# Patient Record
Sex: FEMALE | Race: WHITE | NOT HISPANIC OR LATINO | Employment: UNEMPLOYED | URBAN - METROPOLITAN AREA
[De-identification: names, ages, dates, MRNs, and addresses within clinical notes are randomized per-mention and may not be internally consistent; named-entity substitution may affect disease eponyms.]

---
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
## 2024-01-29 ENCOUNTER — OFFICE VISIT (OUTPATIENT)
Dept: ENDOCRINOLOGY | Facility: HOSPITAL | Age: 19
End: 2024-01-29
Payer: COMMERCIAL

## 2024-01-29 VITALS
HEART RATE: 90 BPM | HEIGHT: 61 IN | WEIGHT: 101 LBS | OXYGEN SATURATION: 99 % | DIASTOLIC BLOOD PRESSURE: 60 MMHG | SYSTOLIC BLOOD PRESSURE: 110 MMHG | BODY MASS INDEX: 19.07 KG/M2

## 2024-01-29 DIAGNOSIS — E05.90 SUBCLINICAL HYPERTHYROIDISM: ICD-10-CM

## 2024-01-29 DIAGNOSIS — R79.89 ABNORMAL TSH: Primary | ICD-10-CM

## 2024-01-29 PROCEDURE — 99214 OFFICE O/P EST MOD 30 MIN: CPT | Performed by: STUDENT IN AN ORGANIZED HEALTH CARE EDUCATION/TRAINING PROGRAM

## 2024-01-29 NOTE — PROGRESS NOTES
1/29/2024    Assessment/Plan        Problem List Items Addressed This Visit          Other    Abnormal TSH - Primary    Relevant Orders    T4, free    T3, free    TSH, 3rd generation     Other Visit Diagnoses       Subclinical hyperthyroidism        Relevant Orders    T4, free    T3, free    TSH, 3rd generation              Assessment/Plan:  Patient is a 18yF with PMHx of HASEEB with subclinical hyperthyroidism.     1) Subclinical hyperthyroidism- Discussed differential for low TSH with normal free t4/T3 indicating subclinical hyperthyroidism. Did have a mild goiter last visit which now has resolved and given normal TSI, TRAb ab, possible could have had a silent thyroiditis then which resolved. Does have some symptoms which could be hyperthyroids vs related to HASEEB but discussed given previous low TSH, should have TFT checked every few months to monitor and make sure not clinically hyperthyroid. Last labs were normal, we will repeat one now. Discussed would ONLY tx if clinically hyperthyroid since given her age, risk of tx subclinical hyperthyroid and being hypothyroid after are higher. Can consider Prn BB if needed    RTC in 6 months     CC: Low TSH    History of Present Illness     HPI: Nato Matta is a 18 y.o. year old female with history of dysthymic disorder, HASEEB who presents today to review low TSH found on routine labs done by PCP d/t having symptoms of fatigue, weight loss and overall not feeling well.   TSH checked 05/23 was 0.47 with repeat 06/23 0.44 with normal Free t4/Free t3.   When initially seen by us, clinically felt ok. Repeat lab work which showed normal TSH and t4/T3, normal TSI, TRAb. Recommended repeat labs in 3-6 months on follow up     Reports still having issues with fatigue, tremors sometimes, some palpitations and diarrhea at times. Menses are regular. Denies any dysphagia, odynophagia.     Social hx- does vape daily, no alcohol use or any other drugs  Family hx- neg for thyroid  issues    Review of Systems   Constitutional:  Positive for fatigue and unexpected weight change.   HENT:  Negative for trouble swallowing and voice change.    Eyes:  Negative for photophobia and visual disturbance.   Respiratory:  Negative for choking and shortness of breath.    Gastrointestinal:  Positive for constipation. Negative for diarrhea.   Endocrine: Negative for cold intolerance and heat intolerance.   Musculoskeletal:  Negative for arthralgias and myalgias.   Skin:  Negative for rash.       Historical Information   Past Medical History:   Diagnosis Date    Allergic     Asthma      No past surgical history on file.  Social History   Social History     Substance and Sexual Activity   Alcohol Use Never     Social History     Substance and Sexual Activity   Drug Use Never     Social History     Tobacco Use   Smoking Status Every Day    Types: E-Cigarettes   Smokeless Tobacco Never     Family History:   Family History   Problem Relation Age of Onset    No Known Problems Mother     Addiction problem Father         Alcoholic    Asthma Sister     Asthma Maternal Grandmother     Diabetes Maternal Grandmother     Heart disease Maternal Grandmother        Meds/Allergies   Current Outpatient Medications   Medication Sig Dispense Refill    albuterol (PROVENTIL HFA,VENTOLIN HFA) 90 mcg/act inhaler Use 1-2 puffs every 4 - 6 hours for wheezing as needed (Patient taking differently: every 6 (six) hours as needed Use 1-2 puffs every 4 - 6 hours for wheezing as needed) 18 g 1    [employer] COVID-19 Home Test KIT Use as directed      cetirizine (ZyrTEC) 10 mg tablet Take 1 tablet (10 mg total) by mouth daily 30 tablet 2    DULoxetine (CYMBALTA) 60 mg delayed release capsule Duloxetine  60mg  : 2 capsule po in the morning 60 capsule 1    EPINEPHrine (EPIPEN) 0.3 mg/0.3 mL SOAJ Inject 0.3 mL (0.3 mg total) into a muscle once for 1 dose 0.6 mL 0    fluticasone (FLONASE) 50 mcg/act nasal spray 1 spray to each nostril once daily  (Patient taking differently: if needed 1 spray to each nostril once daily) 16 g 1    fluticasone (Flovent HFA) 110 MCG/ACT inhaler Inhale 2 puffs 2 (two) times a day Rinse mouth after use (Patient taking differently: Inhale 2 puffs if needed Rinse mouth after use) 12 g 1    glycopyrrolate (ROBINUL) 2 MG tablet Take 1 tablet (2 mg total) by mouth 2 (two) times a day (Patient taking differently: Take 2 mg by mouth if needed) 60 tablet 1    QUEtiapine (SEROquel) 50 mg tablet Quetiapine Fumarate 50m tablets po at night 30 tablet 1     No current facility-administered medications for this visit.     Allergies   Allergen Reactions    Dasilva - Food Allergy     Peach [Prunus Persica]     Strawberry C [Ascorbate - Food Allergy] Itching     throat    Tomato - Food Allergy     Tree Extract Other (See Comments)     Done by blood test      Cucumber Extract - Food Allergy      Throat itchy      Nuts - Food Allergy Itching       Objective   Vitals: There were no vitals taken for this visit.  Invasive Devices       None                 There is no height or weight on file to calculate BMI.  There were no vitals taken for this visit.   Wt Readings from Last 3 Encounters:   23 47.9 kg (105 lb 9.6 oz) (9%, Z= -1.35)*   23 48.4 kg (106 lb 9.6 oz) (10%, Z= -1.27)*   23 49.9 kg (110 lb) (16%, Z= -1.01)*     * Growth percentiles are based on CDC (Girls, 2-20 Years) data.       GEN: NAD  E/n/m nl facies, hearing intact bilat, tongue midline, lips nl  Eyes: no stare or proptosis, nl lids and conjunctiva, EOMI  Neck: trachea midline, thyroid NT to palpation, nl in size, no nodules or neck masses noted, no cervical LAD  CV; heart reg rate s1s2 nl, no m/r/g appreciated, no JEFERSON  Resp: CTAB, good effort  Ab+BS  Neuro: no tremor, 2+ DTRs in BUE  MS: no c/c in digits, moves all 4 ext, nl muscle bulk, gait nl  Skin: warm and dry, no palmar erythema  Ext: no c/c in digits, no edema bilaterally, 2+ DP and PT pulses bilat, no  breaks in skin/ulcers on feet, sensation intact to monofilament testing on plantar surfaces bilat  Psych: nl mood and affect, no gross lapses in memory    Physical Exam  Constitutional:       Appearance: Normal appearance. She is normal weight.   Neck:      Comments: Slightly goitrous gland without any nodules  Cardiovascular:      Rate and Rhythm: Normal rate and regular rhythm.      Pulses: Normal pulses.   Pulmonary:      Effort: Pulmonary effort is normal.   Abdominal:      General: Abdomen is flat. Bowel sounds are normal.      Palpations: Abdomen is soft.   Skin:     General: Skin is warm and dry.      Capillary Refill: Capillary refill takes less than 2 seconds.   Neurological:      General: No focal deficit present.      Mental Status: She is alert and oriented to person, place, and time.   Psychiatric:         Mood and Affect: Mood normal.         The history was obtained from the review of the chart and from the patient.    Lab Results:    Latest Reference Range & Units 05/26/23 12:41 06/19/23 10:50 08/25/23 11:24 11/02/23 10:55   TSH, POC mIU/L   0.33 (L) 0.51   Free T4 0.8 - 1.4 ng/dL 1.1 1.1 1.2 1.2   THYROGLOBULIN AB < or = 1 IU/mL  <1     (L): Data is abnormally low     Latest Reference Range & Units 08/25/23 11:24 11/02/23 10:55   Thyrotropin Bind Inh Ig <=2.00 IU/L  <1.00   THYROID STIMULATING IMMUNOGLOBULIN <140 % baseline <89 <89        Latest Reference Range & Units 05/26/23 12:41 06/19/23 10:50   TSH W/RFX TO FREE T4 mIU/L 0.47 (L) 0.44 (L)   (L): Data is abnormally low   Latest Reference Range & Units 06/19/23 10:50   Free T3 3.0 - 4.7 pg/mL 3.6       Future Appointments   Date Time Provider Department Center   11/5/2024  8:00 AM Rachel Lopez MD OBGYN [facility]

## 2024-01-30 ENCOUNTER — DOCUMENTATION (OUTPATIENT)
Dept: BEHAVIORAL/MENTAL HEALTH CLINIC | Facility: CLINIC | Age: 19
End: 2024-01-30

## 2024-01-30 DIAGNOSIS — F41.1 GENERALIZED ANXIETY DISORDER: Primary | ICD-10-CM

## 2024-01-30 LAB
T3FREE SERPL-MCNC: 3.4 PG/ML (ref 3–4.7)
T4 FREE SERPL-MCNC: 1 NG/DL (ref 0.8–1.4)
TSH SERPL-ACNC: 0.51 MIU/L

## 2024-02-09 ENCOUNTER — TELEPHONE (OUTPATIENT)
Dept: PSYCHIATRY | Facility: CLINIC | Age: 19
End: 2024-02-09

## 2024-02-09 NOTE — TELEPHONE ENCOUNTER
Rec'd message from previous provider Joan that client reached out to schedule with another provider. Joan D/C clt on 1/30 so clt can be set up as NP with new provider. LVM for clt to return call to intake for scheduling.

## 2024-02-23 ENCOUNTER — SOCIAL WORK (OUTPATIENT)
Dept: BEHAVIORAL/MENTAL HEALTH CLINIC | Facility: CLINIC | Age: 19
End: 2024-02-23

## 2024-02-23 ENCOUNTER — TELEPHONE (OUTPATIENT)
Dept: PSYCHIATRY | Facility: CLINIC | Age: 19
End: 2024-02-23

## 2024-02-23 DIAGNOSIS — F41.1 GENERALIZED ANXIETY DISORDER: Primary | ICD-10-CM

## 2024-02-23 NOTE — PSYCH
No Call. No Show. No Charge    Nato Paxton no showed 02/23/24 appointment , staff called and left message to reschedule appointment     Treatment Plan not due at this session.

## 2024-02-23 NOTE — TELEPHONE ENCOUNTER
Outreach call place due to client missing Intake appointment 2/23/24.   Received client voicemail.  Informed client the the follow up appointment that was scheduled on 3/5 @ 10 is being cancelled due to missing the Intake appointment.     Requested client to call back to set up another intake appointment.

## 2024-02-26 ENCOUNTER — NURSE TRIAGE (OUTPATIENT)
Age: 19
End: 2024-02-26

## 2024-02-26 DIAGNOSIS — B37.31 YEAST VAGINITIS: Primary | ICD-10-CM

## 2024-02-26 NOTE — TELEPHONE ENCOUNTER
"Pt called reporting sx yeast infection. Pt requesting Diflucan in the past, requesting dose.    Reason for Disposition   Symptoms of a yeast infection (i.e., itchy, white discharge, not bad smelling) and feels like prior vaginal yeast infections    Answer Assessment - Initial Assessment Questions  1. SYMPTOM: \"What's the main symptom you're concerned about?\" (e.g., pain, itching, dryness)      itching  2. LOCATION: \"Where is the  itching located?\" (e.g., inside/outside, left/right)      vaginal  3. ONSET: \"When did the  itching  start?\"      5 days ago  4. PAIN: \"Is there any pain?\" If Yes, ask: \"How bad is it?\" (Scale: 1-10; mild, moderate, severe)      Painful itch, worse at night  5. ITCHING: \"Is there any itching?\" If Yes, ask: \"How bad is it?\" (Scale: 1-10; mild, moderate, severe)      6  6. CAUSE: \"What do you think is causing the discharge?\" \"Have you had the same problem before? What happened then?\"      Milky white, mild clump  7. OTHER SYMPTOMS: \"Do you have any other symptoms?\" (e.g., fever, itching, vaginal bleeding, pain with urination, injury to genital area, vaginal foreign body)      Mild odor, not fishy  8. PREGNANCY: \"Is there any chance you are pregnant?\" \"When was your last menstrual period?\"      2/18/24    Protocols used: Vaginal Symptoms-ADULT-[place]    "

## 2024-02-27 RX ORDER — FLUCONAZOLE 150 MG/1
150 TABLET ORAL ONCE
Qty: 1 TABLET | Refills: 0 | Status: SHIPPED | OUTPATIENT
Start: 2024-02-27 | End: 2024-02-28 | Stop reason: SDUPTHER

## 2024-02-28 DIAGNOSIS — B37.31 YEAST VAGINITIS: ICD-10-CM

## 2024-02-28 RX ORDER — FLUCONAZOLE 150 MG/1
150 TABLET ORAL ONCE
Qty: 1 TABLET | Refills: 0 | Status: SHIPPED | OUTPATIENT
Start: 2024-02-28 | End: 2024-02-28

## 2024-03-07 ENCOUNTER — TELEPHONE (OUTPATIENT)
Dept: DERMATOLOGY | Facility: CLINIC | Age: 19
End: 2024-03-07

## 2024-03-07 NOTE — TELEPHONE ENCOUNTER
Lmom that 07/17/24 appt w/Dr Salcido at [facility] has been r/s to the [place] [facility] due to change in the providers schedule, to please call the office to confirm or r/s

## 2024-03-07 NOTE — TELEPHONE ENCOUNTER
Rec'd return call from patient. She states that she's aware her July appt is now in [place] office. Patient is aware of office location.

## 2024-03-20 DIAGNOSIS — F41.1 GENERALIZED ANXIETY DISORDER: ICD-10-CM

## 2024-03-20 DIAGNOSIS — F32.2 CURRENT SEVERE EPISODE OF MAJOR DEPRESSIVE DISORDER WITHOUT PSYCHOTIC FEATURES WITHOUT PRIOR EPISODE (HCC): ICD-10-CM

## 2024-03-20 RX ORDER — DULOXETIN HYDROCHLORIDE 60 MG/1
CAPSULE, DELAYED RELEASE ORAL
Qty: 60 CAPSULE | Refills: 1 | Status: SHIPPED | OUTPATIENT
Start: 2024-03-20

## 2024-03-20 RX ORDER — QUETIAPINE FUMARATE 50 MG/1
TABLET, FILM COATED ORAL
Qty: 30 TABLET | Refills: 1 | OUTPATIENT
Start: 2024-03-20

## 2024-03-20 RX ORDER — QUETIAPINE FUMARATE 50 MG/1
TABLET, FILM COATED ORAL
Qty: 30 TABLET | Refills: 1 | Status: SHIPPED | OUTPATIENT
Start: 2024-03-20 | End: 2024-03-27 | Stop reason: SDUPTHER

## 2024-03-20 RX ORDER — DULOXETIN HYDROCHLORIDE 60 MG/1
CAPSULE, DELAYED RELEASE ORAL
Qty: 60 CAPSULE | Refills: 1 | OUTPATIENT
Start: 2024-03-20

## 2024-03-20 NOTE — TELEPHONE ENCOUNTER
Pt Nato Matta , 2005 , [facility] chart reviewed Duloxetine 60mg, Quetiapine 50mg was sent to [facility]     This note was not shared with the patient due to reasonable likelihood of causing patient harm      This note may have been written with the assistance of dictation software. Please excuse any grammatical  errors, misspellings,  and abnormal spacing of letters , sentences or paragraphs . For accurate interpretation should read note horizontally

## 2024-03-21 DIAGNOSIS — Z00.6 ENCOUNTER FOR EXAMINATION FOR NORMAL COMPARISON OR CONTROL IN CLINICAL RESEARCH PROGRAM: ICD-10-CM

## 2024-03-26 NOTE — PSYCH
"   [facility]- Non Addiction  [address] [place] [address]   [phone]    Psychiatric Progress Note  MRN#: [id number]   Nato Matta 19 y.o. female.        This note was not shared with the patient due to reasonable likelihood of causing patient harm   __________________________________________________________________________________________________________________________________  OFFICE APPOINTMENT   Seen today at [facility] location                                               Patient Nato Matta female  2005   Prescriber/Physician: Andre Delarosa DO  Physician Location:   [facility]  [address] [place] [id number]     This service was provided in the office.  Patient is currently located in the [place], where I am  licensed.   Patient gave consent to proceed with encounter; acknowledge understanding of security and privacy of encounter   Patient identity was verified as well as the [facility] chart  Patient verbalized understanding evaluation only involves Psychiatric diagnosing, prescribing, result monitoring   Patient was informed this is a billable service and legal  __________________________________________________________________________________________________________      Reason for Visit:    Chief Complaint   Patient presents with    Depression        Subjective:  Nato Matta was last seen in Dec  2023, since Duloxetine was increased and Seroquel was started. Reports on slight improvement with Seroquel  \" less up and down emotional, able to sleep. Otherwise , Nato Wile complaiend of limited appetite, weight loss ( 102 lb , height 5'0), eats only dinner mostly. Denies intentional restrictions or binging, or abuse of exercise or pills.   Mood was reported as depressed and anxiety  as Nato Alessioe acknowledged mild SI,w/o plan or intent, concentration is also low . " "Described difficulties focusing on 1 things , has million different thoughts , random , also ruminating about prior childhood events, thoughts are worst at night , and this effects Nato Matta ability to do things.       ROS:  SI/HI  :  defer to above   Irritability: Nato stated \" a lot  about anything and everything, mostly towards boyfriend , non physical   Sleep: defer to above   Energy: low , although Nato reported also on restlessness when not sleeping, onset prior to starting Seroquel    Psychosis: denies   Ml:  + Impulsivity  described as spending a lot of money and going somewhere, episodes, per Alexia occurs twice within the last 2 wks. Denies increse in activiites         Medication: Nato Matta is  compliant Duloxetine 120mg, Seroquel 50mg  Medication s/e: denies   Med trail: Sertraline    Tobacco Use: High Risk (12/1/2023)    Patient History     Smoking Tobacco Use: Every Day     Smokeless Tobacco Use: Never     Passive Exposure: Not on file      Tobacco- Sera vapes nicotine daily - habit   Illicit- denies  Alcohol- denies         Medical ROS:   Pertinent items are noted in HPI, all other symptoms are negative         This note may have been written with the assistance of dictation software. Please excuse any grammatical  errors, misspellings,  and abnormal spacing of letters , sentences or paragraphs . For accurate interpretation should read note horizontally   ______________________________________________________________________________________________________________________      Mental status:    General Appearance:  Nato Matta is a 19 y.o.  female age appropriate, casually dressed, looks stated age   Behavior:  cooperative, calm, appropriate eye gaze, slight guarded    Speech:  normal for rate, rhythm, volume, latency, amount   Mood:  depressed and anxious,anger    Affect:  blunted and depressed   Thought Process:  normal and logical   Thought Content:  no overt delusions, normal " "  Perceptual Disturbances: denies auditory hallucinations when asked, denies visual hallucinations when asked, Does not appear responding or preoccupied  Delusions  w/o   Risk Potential: Suicidal Ideations w/o  Homicidal Ideations w/o  Potential for Aggression w/o   Sensorium:  Oriented to person, place ( [facility]), time/date (March 2024), and situation   Memory:  recent and remote memory grossly intact   Consciousness:  alert and awake   Attention: attention span and concentration are age appropriate   Insight:  Appropriate    Judgment: appropriate   Gait/Station: normal   Motor Activity: no abnormal movements       There were no vitals filed for this visit.      Lab Results: I have personally reviewed all pertinent laboratory/tests results. Admission Labs:       Labs: I have personally reviewed all pertinent laboratory/tests results. Admission Labs:   Orders Only on 01/30/2024   Component Date Value    Free t4 01/30/2024 1.0     TSH 01/30/2024 0.51     Free T3 01/30/2024 3.4      CBC:   Lab Results   Component Value Date    WBC 5.4 05/26/2023    RBC 4.45 05/26/2023    HGB 12.9 05/26/2023    HCT 37.8 05/26/2023    MCV 84.9 05/26/2023     05/26/2023    MCH 29.0 05/26/2023    MCHC 34.1 05/26/2023    RDW 13.7 05/26/2023    MPV 9.7 09/16/2022    NEUTROABS 3,445 05/26/2023     CMP:   Lab Results   Component Value Date    SODIUM 136 05/26/2023    K 4.1 05/26/2023     05/26/2023    CO2 26 05/26/2023    AGAP 10 09/16/2022    BUN 14 05/26/2023    CREATININE 0.68 05/26/2023    GLUC 82 05/26/2023    CALCIUM 9.1 05/26/2023    AST 13 05/26/2023    ALT 10 05/26/2023    ALKPHOS 40 05/26/2023    TP 6.5 05/26/2023    ALB 4.1 05/26/2023    TBILI 0.5 05/26/2023    EGFR 129 05/26/2023     Lipid Profile:   Lab Results   Component Value Date    CHOLESTEROL 155 05/26/2023    HDL 60 05/26/2023    TRIG 62 05/26/2023    LDLCALC 81 05/26/2023     Hemoglobin A1C/EST AVG Glucose No results found for: \"HGBA1C\", \"EAG\"  EKG "   Lab Results   Component Value Date    VENTRATE 82 09/16/2021    ATRIALRATE 82 09/16/2021    PRINT 158 09/16/2021    QRSDINT 70 09/16/2021    QTINT 358 09/16/2021    QTCINT 418 09/16/2021    PAXIS 71 09/16/2021    QRSAXIS 81 09/16/2021    TWAVEAXIS 74 09/16/2021     Imaging Studies: No results found.    PHQ-2/9 Depression Screening              Labs 05/2023 WNL CBC, CMP, Lipids , 11/2023 WNL T3       PHQ-2/9 Depression Screening    Little interest or pleasure in doing things: 3 - nearly every day  Feeling down, depressed, or hopeless: 3 - nearly every day  Trouble falling or staying asleep, or sleeping too much: 2 - more than half the days  Feeling tired or having little energy: 3 - nearly every day  Poor appetite or overeating: 3 - nearly every day  Feeling bad about yourself - or that you are a failure or have let yourself or your family down: 3 - nearly every day  Trouble concentrating on things, such as reading the newspaper or watching television: 3 - nearly every day  Moving or speaking so slowly that other people could have noticed. Or the opposite - being so fidgety or restless that you have been moving around a lot more than usual: 3 - nearly every day  Thoughts that you would be better off dead, or of hurting yourself in some way: 1 - several days  PHQ-9 Score: 24  PHQ-9 Interpretation: Severe depression        HASEEB-7 Flowsheet Screening      Flowsheet Row Most Recent Value   Over the last 2 weeks, how often have you been bothered by any of the following problems?    Feeling nervous, anxious, or on edge 3   Not being able to stop or control worrying 2   Worrying too much about different things 2   Trouble relaxing 3   Being so restless that it is hard to sit still 3   Becoming easily annoyed or irritable 3   Feeling afraid as if something awful might happen 2   HASEEB-7 Total Score 18                  _______________________________________________________________________________________    A/GENARO Matta  is a 19 y.o.  female, h/o anxiety, who presented  with several neurovegatative symptoms and generalized pattern anxiety , while on Duloxetine. Currently with worsening depression and anxiety severity on rating forms and history reported since higher dose Duloxetine  120mg > 2 months .Slight improvement of sleep and change of mood since Seroquel 50mg.  Case complicated as there's also reported anger, nightly worries , random and about prior events , and restlessness, suspicion of bipolar disorder . Currently Nato reported on SI, without plan or intent, although limited appetite and weight loss, likely due to depression      HASEEB 7: 17---> 18  PHQ9= 23--->24        DSM5  1. Current severe episode of major depressive disorder without psychotic features without prior episode (HCC)    2. Generalized anxiety disorder     Rule out Bipolar Disorder       PLAN:  History and external records were reviewed. Discussed clinical findings and diagnotic impression: depression, anxiety , supsiion of unspecified bipolar disorder  Plans to gather collateral and rating form regarding bipolarity  Increased Quetiapine from 50mg to 150mg within 2 wks, pt Nato Matta was accepting of plan  Did not change other medications    [facility]  -     QUEtiapine (SEROquel) 50 mg tablet; Quetiapine Fumarate 50m tablets at night po x 1 wk. Then increased to 1 tablet in the morning and 2 tablets at night        -     DULoxetine (CYMBALTA) 60 mg delayed release capsule; Duloxetine  60mg  : 2 capsule po in the morning       There are no discontinued medications.       Treatment Recommendations- Risks Benefits  Risks, Benefits And Possible Side Effects Of Medications:  Risks, benefits, and possible side effects of medications explained to patient and patient verbalizes understanding    Next Appt @[facility]: 1 month    __________________________________________________________________________________________________  Patient Encounter: 9:04- 9:44    Encounter Duration: Time Spent  40 with Patient.Greater than 50% of total time was spent with the patient     MDM  Number of Diagnoses or Management Options  Diagnosis management comments: 2       Amount and/or Complexity of Data Reviewed  Review and summarize past medical records: yes    Risk of Complications, Morbidity, and/or Mortality  Presenting problems: high  Diagnostic procedures: moderate  Management options: moderate         This note may have been written with the assistance of dictation software. Please excuse any grammatical  errors, misspellings,  and abnormal spacing of letters , sentences or paragraphs . For accurate interpretation should read note horizontally

## 2024-03-26 NOTE — PATIENT INSTRUCTIONS
Nato Matta [id number]   Thanks for presenting to today's Appointment at [facility]  Seroquel was increased to  100mg per night for 1 wk. Then  50mg in the morning and 100mg at night   Your other medications were not changed

## 2024-03-27 ENCOUNTER — OFFICE VISIT (OUTPATIENT)
Dept: PSYCHIATRY | Facility: CLINIC | Age: 19
End: 2024-03-27
Payer: COMMERCIAL

## 2024-03-27 DIAGNOSIS — F32.2 CURRENT SEVERE EPISODE OF MAJOR DEPRESSIVE DISORDER WITHOUT PSYCHOTIC FEATURES WITHOUT PRIOR EPISODE (HCC): Primary | ICD-10-CM

## 2024-03-27 DIAGNOSIS — F41.1 GENERALIZED ANXIETY DISORDER: ICD-10-CM

## 2024-03-27 PROCEDURE — 99214 OFFICE O/P EST MOD 30 MIN: CPT | Performed by: PSYCHIATRY & NEUROLOGY

## 2024-03-27 RX ORDER — QUETIAPINE FUMARATE 50 MG/1
TABLET, FILM COATED ORAL
Qty: 90 TABLET | Refills: 1 | Status: SHIPPED | OUTPATIENT
Start: 2024-03-27

## 2024-04-17 ENCOUNTER — NURSE TRIAGE (OUTPATIENT)
Age: 19
End: 2024-04-17

## 2024-04-17 NOTE — TELEPHONE ENCOUNTER
"Patient called stating she has been off of birth control since August 2023. States she would like to resume and asked when she could start pills. LMP 4/12. Advised patient typically started Sunday after menses - will reach out to provider to advise when she can start pill - start now vs wait for next menses. Patient states she is no longer in same-sex relationship. Advised once she starts a new pill pack, she should use a back up form of birth control (condoms) for first pack of pills. Patient aware will receive call back to confirm. Patient verbalized understanding. No further questions at this time.     Reason for Disposition   Patient has refills remaining on their prescription    Answer Assessment - Initial Assessment Questions  1. NAME of MEDICATION: \"What medicine are you calling about?\"      Colleen Vazquez  2. QUESTION: \"What is your question?\" (e.g., medication refill, side effect)      When to start birth control    Protocols used: Medication Question Call-ADULT-[place]    "

## 2024-04-18 NOTE — TELEPHONE ENCOUNTER
Placed call to patient, unable to connect.  Tried twice. [employer] message sent to patient (okay per communication form).

## 2024-05-16 ENCOUNTER — TELEPHONE (OUTPATIENT)
Dept: PSYCHIATRY | Facility: CLINIC | Age: 19
End: 2024-05-16

## 2024-05-16 NOTE — TELEPHONE ENCOUNTER
Client called to reschedule with Dr. Gipson, writer forwarded client to his team to reach out in the morning.

## 2024-05-20 ENCOUNTER — TELEPHONE (OUTPATIENT)
Dept: PSYCHIATRY | Facility: CLINIC | Age: 19
End: 2024-05-20

## 2024-05-21 ENCOUNTER — TELEPHONE (OUTPATIENT)
Dept: PSYCHIATRY | Facility: CLINIC | Age: 19
End: 2024-05-21

## 2024-05-21 PROBLEM — R20.0 NUMBNESS AND TINGLING OF RIGHT ARM: Status: RESOLVED | Noted: 2023-06-08 | Resolved: 2024-05-21

## 2024-05-21 PROBLEM — R20.2 NUMBNESS AND TINGLING OF RIGHT ARM: Status: RESOLVED | Noted: 2023-06-08 | Resolved: 2024-05-21

## 2024-05-21 RX ORDER — NORETHINDRONE ACETATE AND ETHINYL ESTRADIOL AND FERROUS FUMARATE 1.5-30(21)
1 KIT ORAL DAILY
COMMUNITY
Start: 2024-02-24 | End: 2024-05-28 | Stop reason: ALTCHOICE

## 2024-05-21 RX ORDER — NEOMYCIN SULFATE, POLYMYXIN B SULFATE AND HYDROCORTISONE 10; 3.5; 1 MG/ML; MG/ML; [USP'U]/ML
SUSPENSION/ DROPS AURICULAR (OTIC)
COMMUNITY
Start: 2024-03-30

## 2024-05-21 RX ORDER — FLUCONAZOLE 150 MG/1
TABLET ORAL
COMMUNITY
Start: 2024-02-28 | End: 2024-05-28 | Stop reason: ALTCHOICE

## 2024-05-21 NOTE — TELEPHONE ENCOUNTER
LVM regarding clts apt for today needing to be rescheduled.  Apt not scheduled for the correct amount of time.

## 2024-05-21 NOTE — PSYCH
"Psychiatric Medication Management - Behavioral Health   Nato Matta 19 y.o. female MRN: [id number]    This note was not shared with the patient due to reasonable likelihood of causing patient harm       Reason for Visit: Transfer of care for medication management    Subjective:  Medication compliance: yes  Medication side effects: denies     Nato is a 19 year old female being seen today for transfer of care appointment for medication management. Her last appointment with Dr Delarosa was 03/27/2024. Patient has psychiatric diagnoses including MDD and HASEEB. Patient is currently being observed on Cymbalta 120 mg daily and Seroquel 50 mg in AM and 100 mg at HS.  Patient was previously connected to outpatient therapy with Terri Hoang, however, is not in therapy at this time. She is interested in being placed on the wait list for another provider here at [facility]. No additional services in place at this time.     Nato reports her mood today as \"average\". States she does not feel like the Cymbalta is really helping that much. Reports that Seroquel does help slightly, however, does not think it has been beneficial for sleep. Patient states her sleep is \"not good\" as she has trouble falling asleep and staying asleep. Her sleep is often interrupted with getting up to go to the bathroom and then she has a hard time getting back to sleep. We spoke about sleep hygiene and staying off of screens before bedtime. She verbalized understanding. She states she averages 4 to 5 hours per night.  She reports her energy levels are \"fairly low\" and she has no motivation. States her appetite is not good and she only eats 1 meal per day which has been typical for her for the past few years. She states she does watch what she eats. Reports weight loss from 113 pounds to 101 pounds but she is unsure of the timeframe. Denies disordered eating.    Nato endorses acute and chronic history of symptoms suggestive of MDD (major depressive " disorder). She reports her symptoms started approximately 7 years ago or even earlier after her dad left the family. She does not  have a relationship with her father for the past 7 years. She rates her depression today a 9/10 on the severity scale.  Reports disrupted/non-restorative sleep likely exacerbating mood symptoms. Endorses limited appetite, lack of energy, and poor motivation. Reports low mood and anhedonia.  She has frequent crying spells that are typically after an angry episode because she feels bad and wonders why she reacts that way.  Adamantly denies acute thoughts of suicide or self-harm and has no plans to harm others. She does endorse thoughts of suicide that are passive in nature with no plan and no intent.  Nato reports that she can keep herself safe and knows to go to the nearest emergency room and time of crisis.  No documented history of prior suicidal gestures or suicidal attempts. Denies historical non-suicidal self injurious behavior. Future-oriented and demonstrates self-preservation as evidenced by today's evaluation in which Nato is seeking psychiatric intervention to improve overall mental health and outlook on life.  Reports feeling worthlessness, hopelessness, and guilt a lot of the time.  PHQ-9 score 25    Nato endorses acute and chronic anxiety, pathologic in nature, and suggestive of HASEEB (generalized anxiety disorder).  She states her anxiety started 7 years ago when her dad left the family.  She tends to worry about everything and anything.  Today she reports her anxiety and 8/10 on the severity scale.  Reports excessive nervousness, irrational worry, and overt anxiousness.  Nato is pervasively restless, tense, keyed-up, and chronically on-edge. She experiences disruption in energy and concentration secondary to anxiety. Experiences irritability, inability to relax, and disruption in sleep secondary to pathologic anxiety. At times, overwhelmed/consumed by irrational fear.  Denies new-onset panic symptomatology or maladaptive behaviors.  Denies past or present panic attacks.  HASEEB-7 score 20     Nato reports a lot of the time she feels aggression without much of a trigger. She said it could be about anything and occurs multiple times per day. She endorses frequent mood swings multiple times per day and feels irritable most of the time. She denies any elevated moods where she did not need much sleep. She can go hours where she can get more done than usual.  She has periods of impulsiveness where she will spend money that she does not have.  Reports 1 time this impulsivity of spending interfered with her daily bills.  She reports some impulsive sexual promiscuity. She denies AH/VH.  Denies any symptoms suggestive of ADHD, OCD, PTSD. There are no access to guns or weapons in the home. Will start to wean off the Cymbalta to 90 mg for 1 week and then down to 60 mg for 1 week.  Increase the Seroquel to 200 mg at bedtime.    Review Of Systems:     Constitutional Negative   ENT Negative   Cardiovascular Negative   Respiratory Negative   Gastrointestinal Negative   Genitourinary Negative   Musculoskeletal Negative   Integumentary Negative   Neurological Negative   Endocrine Negative     Past Medical History:   Patient Active Problem List   Diagnosis    Other idiopathic scoliosis, thoracolumbar region    Generalized anxiety disorder    Dysthymic disorder    Asthma    Multiple allergies    Abnormal TSH    Current severe episode of major depressive disorder without psychotic features without prior episode (HCC)       Seizure history-  Denies    Allergies:   Allergies   Allergen Reactions    Measles And Rubella Virus Vaccine Anaphylaxis, Hives and Shortness Of Breath    Cherry - Food Allergy     Peach [Prunus Persica]     Strawberry C [Ascorbate - Food Allergy] Itching     throat    Tomato - Food Allergy     Tree Extract Other (See Comments)     Done by blood test      Cucumber Extract - Food  "Allergy      Throat itchy      Nuts - Food Allergy Itching       Past Surgical History: History reviewed. No pertinent surgical history.    Past Psychiatric History:   Past Inpatient Psychiatric Treatment:   No history of past inpatient psychiatric admissions  Past Outpatient Psychiatric Treatment:    Dr Isaura Calabrese  Past Suicide Attempts: no  Past Violent Behavior: yes  Past Psychiatric Medication Trials: Prozac, Zoloft, Lexapro, Cymbalta, and Seroquel    Family Psychiatric History:   Maternal Grandmother- mental illness on medication  Mom- undiagnosed bipolar    Social History:   Living situation- Mom and brother   Single - in a relationship  Kids- no  Occupation - In process of finding a new job  Hobbies- Not really- spending time with BF and dog  Still enjoys doing things    Substance Abuse History:   Denies use of alcohol, nicotine, tobacco, marijuana, or other illicit drugs.      Traumatic History:    Denies history of physical, verbal, sexual, and emotional abuse.    The following portions of the patient's history were reviewed and updated as appropriate: allergies, current medications, past family history, past medical history, past social history, past surgical history, and problem list.    Objective:  There were no vitals filed for this visit.      Weight (last 2 days)       None            Mental status:  Appearance sitting comfortably in chair, restless and fidgety, dressed in casual clothing, adequate hygiene and grooming   Mood \"Average\"    Affect Appears generally euthymic, stable, mood-congruent   Speech Normal rate, rhythm, and volume   Thought Processes Linear and goal directed   Associations intact associations   Hallucinations Denies any auditory or visual hallucinations   Thought Content Fleeting passive suicidal ideation and No plan or intent, is able to keep herself safe   Orientation Oriented to person, place, time, and situation   Recent and Remote Memory Grossly intact   Attention Span " and Concentration Concentration intact   Intellect Appears to be of Average Intelligence   Insight Insight intact   Judgement judgment was intact   Muscle Strength Muscle strength and tone were normal   Language Within normal limits   Fund of Knowledge Age appropriate   Pain None     PHQ-A Depression Screening    Feeling down, depressed, irritable or hopeless: 3 - nearly every day  Little interest or pleasure in doing things: 3 - nearly every day  Trouble falling or staying asleep, or sleeping too much: 3 - nearly every day  Poor appetite or overeating: 3 - nearly every day  Feeling tired or having little energy: 3 - nearly every day  Feeling bad about yourself - or that you are a failure or have let yourself or your family down: 3 - nearly every day  Trouble concentrating on things, such as reading the newspaper or watching television: 3 - nearly every day  Moving or speaking so slowly that other people could have noticed. Or the opposite - being so fidgety or restless that you have been moving around a lot more than usual: 2 - more than half the days  Thoughts that you would be better off dead, or of hurting yourself in some way: 2 - more than half the days          HASEEB-7 Flowsheet Screening      Flowsheet Row Most Recent Value   Over the last 2 weeks, how often have you been bothered by any of the following problems?    Feeling nervous, anxious, or on edge 3   Not being able to stop or control worrying 3   Worrying too much about different things 3   Trouble relaxing 3   Being so restless that it is hard to sit still 3   Becoming easily annoyed or irritable 3   Feeling afraid as if something awful might happen 2   HASEEB-7 Total Score 20           Assessment/Plan:     Impression:  Major depressive disorder  Generalized anxiety disorder  Mood Disorder  R/O  Bipolar Disorder     1.   Decrease Cymbalta to 90 mg daily for 1 week then decrease to 60 mg for 1 week for anxiety and depression. (Will continue to wean off  this medication.)  Increase Seroquel 200 mg daily for mood and sleep  Interested in being put on the wait list for outpatient therapy at   Aware to go to the nearest emergency room for crisis  Medical follow up with PCP as needed  Aware of 24 hour and weekend coverage for urgent situations accessed by calling [facility] Outpatient main practice number  Follow up in 3 weeks     Diagnoses:   Diagnoses and all orders for this visit:    Current severe episode of major depressive disorder without psychotic features without prior episode (HCC)  -     DULoxetine (Cymbalta) 30 mg delayed release capsule; Take 1 capsule (30 mg total) by mouth daily  -     DULoxetine (CYMBALTA) 60 mg delayed release capsule; Take 1 capsule (60 mg total) by mouth daily  -     QUEtiapine (SEROquel) 100 mg tablet; Take 2 tablets at bedtime (for a total of 200 mg) daily    Mood disorder (HCC)    Generalized anxiety disorder  -     DULoxetine (Cymbalta) 30 mg delayed release capsule; Take 1 capsule (30 mg total) by mouth daily  -     DULoxetine (CYMBALTA) 60 mg delayed release capsule; Take 1 capsule (60 mg total) by mouth daily  -     QUEtiapine (SEROquel) 100 mg tablet; Take 2 tablets at bedtime (for a total of 200 mg) daily    Other orders  -     fluconazole (DIFLUCAN) 150 mg tablet; TAKE ONE TABLET BY MOUTH FOR ONE DOSE (Patient not taking: Reported on 5/22/2024)  -     neomycin-polymyxin-hydrocortisone (CORTISPORIN) 0.35%-10,000 units/mL-1% otic suspension; instill THREE drops INTO THE AFFECTED EAR TWICE DAILY FOR 7 DAYS (Patient not taking: Reported on 5/22/2024)  -     Nahomi FE 1.5/30 1.5-30 MG-MCG tablet; Take 1 tablet by mouth daily (Patient not taking: Reported on 5/22/2024)          Treatment Recommendations:      Risks, Benefits And Possible Side Effects Of Medications:  Risks, benefits, and possible side effects of medications explained to patient and family, they verbalize understanding    Controlled  Medication Discussion: No records found for controlled prescriptions according to [place] Prescription Drug Monitoring Program.      Treatment Plan:Treatment plan was completed at today's visit. Patient verbally consented and signed form while in the office today. Next treatment plan due 11/22/2024.     Visit Time    Visit Start Time: 1:00 PM  Visit Stop Time: 1:30 PM  Total Visit Duration:  30 minutes      ANSHU Renee  05/22/24

## 2024-05-22 ENCOUNTER — OFFICE VISIT (OUTPATIENT)
Dept: PSYCHIATRY | Facility: CLINIC | Age: 19
End: 2024-05-22
Payer: COMMERCIAL

## 2024-05-22 DIAGNOSIS — F39 MOOD DISORDER (HCC): ICD-10-CM

## 2024-05-22 DIAGNOSIS — F41.1 GENERALIZED ANXIETY DISORDER: ICD-10-CM

## 2024-05-22 DIAGNOSIS — F32.2 CURRENT SEVERE EPISODE OF MAJOR DEPRESSIVE DISORDER WITHOUT PSYCHOTIC FEATURES WITHOUT PRIOR EPISODE (HCC): Primary | ICD-10-CM

## 2024-05-22 PROCEDURE — 99214 OFFICE O/P EST MOD 30 MIN: CPT

## 2024-05-22 RX ORDER — DULOXETIN HYDROCHLORIDE 60 MG/1
60 CAPSULE, DELAYED RELEASE ORAL DAILY
Qty: 30 CAPSULE | Refills: 0 | Status: SHIPPED | OUTPATIENT
Start: 2024-05-22

## 2024-05-22 RX ORDER — QUETIAPINE FUMARATE 100 MG/1
TABLET, FILM COATED ORAL
Qty: 60 TABLET | Refills: 0 | Status: SHIPPED | OUTPATIENT
Start: 2024-05-22

## 2024-05-22 RX ORDER — DULOXETIN HYDROCHLORIDE 30 MG/1
30 CAPSULE, DELAYED RELEASE ORAL DAILY
Qty: 7 CAPSULE | Refills: 0 | Status: SHIPPED | OUTPATIENT
Start: 2024-05-22

## 2024-05-22 NOTE — BH TREATMENT PLAN
TREATMENT PLAN (Medication Management Only)        [facility]    Name and Date of Birth:  Nato Matta 19 y.o. 2005  Date of Treatment Plan: May 22, 2024  Diagnosis/Diagnoses:   1. Current severe episode of major depressive disorder without psychotic features without prior episode (HCC)    2. Generalized anxiety disorder      Strengths/Personal Resources for Self-Care: taking medications as prescribed, ability to adapt to life changes, ability to communicate needs, ability to communicate well, ability to listen, ability to reason, ability to understand psychiatric illness.  Area/Areas of need (in own words): anxiety symptoms, depressive symptoms  1. Long Term Goal: maintain improvement in depression.    Target Date: 11/22/2024  Person/Persons responsible for completion of goal: Nato  2.  Short Term Objective (s) - How will we reach this goal?:   A. Provider new recommended medication/dosage changes and/or continue medication(s): continue current medications as prescribed.  B. Keep regularly scheduled psychiatric appointments  C. Maintain adherence to psychotropic medication regimen   D. Exercise daily (at least 30 mins)  E. Maintain appropriate dietary intake  F. Practice adequate sleep hygiene    Target Date: 11/22/2024  Person/Persons Responsible for Completion of Goal: Nato  Progress Towards Goals: continuing treatment  Treatment Modality: medication management every 4 weeks  Review due 180 days from date of this plan:  11/22/2024  Expected length of service: ongoing treatment  My Physician/PA/NP and I have developed this plan together and I agree to work on the goals and objectives. I understand the treatment goals that were developed for my treatment.

## 2024-05-27 ENCOUNTER — NURSE TRIAGE (OUTPATIENT)
Dept: OTHER | Facility: OTHER | Age: 19
End: 2024-05-27

## 2024-05-27 NOTE — TELEPHONE ENCOUNTER
"Reason for Disposition  • Blood in urine  (Exception: could be normal menstrual bleeding)    Answer Assessment - Initial Assessment Questions  1. COLOR of URINE: \"Describe the color of the urine.\"  (e.g., tea-colored, pink, red, blood clots, bloody)     Red    2. ONSET: \"When did the bleeding start?\"       A couple of days    3. EPISODES: \"How many times has there been blood in the urine?\" or \"How many times today?\"  7 times    4. PAIN with URINATION: \"Is there any pain with passing your urine?\" If Yes, ask: \"How bad is the pain?\"  (Scale 1-10; or mild, moderate, severe)     - MILD - complains slightly about urination hurting     - MODERATE - interferes with normal activities       - SEVERE - excruciating, unwilling or unable to urinate because of the pain       8/10     5. FEVER: \"Do you have a fever?\" If Yes, ask: \"What is your temperature, how was it measured, and when did it start?\"   No    6. ASSOCIATED SYMPTOMS: \"Are you passing urine more frequently than usual?\"      Yes    7. OTHER SYMPTOMS: \"Do you have any other symptoms?\" (e.g., back/flank pain, abdominal pain, vomiting)    Painful urination and mild abdominal pain      8. PREGNANCY: \"Is there any chance you are pregnant?\" \"When was your last menstrual period?\"    Denies    Protocols used: Urine - Blood In-ADULT-    "

## 2024-05-27 NOTE — TELEPHONE ENCOUNTER
"Regarding: possible UTI / burning / blood  ----- Message from Yue MERINO sent at 5/27/2024  6:38 PM EDT -----  \"I think I have a UTI, it hurts to pee, it kind of burns and then I feel like I'm done and it keeps going and then it really hurts and there a little blood and just overall discomfort\"    "

## 2024-05-27 NOTE — TELEPHONE ENCOUNTER
"Regarding: painful urination/burning/blood  ----- Message from Josefina THACKER sent at 5/27/2024  6:01 PM EDT -----  \"I think I have a UTI, it hurts to pee, it kind of burns and then I feel like I'm done and it keeps going and then it really hurts and there a little blood and just overall discomfort\"    "

## 2024-05-28 ENCOUNTER — OFFICE VISIT (OUTPATIENT)
Age: 19
End: 2024-05-28
Payer: COMMERCIAL

## 2024-05-28 VITALS — SYSTOLIC BLOOD PRESSURE: 110 MMHG | BODY MASS INDEX: 17.79 KG/M2 | DIASTOLIC BLOOD PRESSURE: 68 MMHG | WEIGHT: 92.6 LBS

## 2024-05-28 DIAGNOSIS — N76.0 VAGINAL INFECTION: ICD-10-CM

## 2024-05-28 DIAGNOSIS — R31.9 HEMATURIA, UNSPECIFIED TYPE: ICD-10-CM

## 2024-05-28 DIAGNOSIS — R39.9 UTI SYMPTOMS: Primary | ICD-10-CM

## 2024-05-28 DIAGNOSIS — R30.0 BURNING WITH URINATION: ICD-10-CM

## 2024-05-28 LAB
SL AMB  POCT GLUCOSE, UA: ABNORMAL
SL AMB LEUKOCYTE ESTERASE,UA: ABNORMAL
SL AMB POCT BILIRUBIN,UA: ABNORMAL
SL AMB POCT BLOOD,UA: ABNORMAL
SL AMB POCT CLARITY,UA: CLEAR
SL AMB POCT COLOR,UA: YELLOW
SL AMB POCT KETONES,UA: ABNORMAL
SL AMB POCT NITRITE,UA: ABNORMAL
SL AMB POCT PH,UA: ABNORMAL
SL AMB POCT SPECIFIC GRAVITY,UA: 1
SL AMB POCT URINE PROTEIN: ABNORMAL
SL AMB POCT UROBILINOGEN: NORMAL

## 2024-05-28 PROCEDURE — 99213 OFFICE O/P EST LOW 20 MIN: CPT | Performed by: OBSTETRICS & GYNECOLOGY

## 2024-05-28 PROCEDURE — 81002 URINALYSIS NONAUTO W/O SCOPE: CPT | Performed by: OBSTETRICS & GYNECOLOGY

## 2024-05-28 RX ORDER — NITROFURANTOIN 25; 75 MG/1; MG/1
100 CAPSULE ORAL 2 TIMES DAILY
Qty: 14 CAPSULE | Refills: 2 | Status: SHIPPED | OUTPATIENT
Start: 2024-05-28 | End: 2024-06-04

## 2024-05-28 RX ORDER — PHENAZOPYRIDINE HYDROCHLORIDE 200 MG/1
200 TABLET, FILM COATED ORAL
Qty: 6 TABLET | Refills: 2 | Status: SHIPPED | OUTPATIENT
Start: 2024-05-28 | End: 2024-05-30

## 2024-05-28 RX ORDER — FLUCONAZOLE 150 MG/1
150 TABLET ORAL WEEKLY
Qty: 2 TABLET | Refills: 2 | Status: SHIPPED | OUTPATIENT
Start: 2024-05-28 | End: 2024-06-05

## 2024-05-28 NOTE — TELEPHONE ENCOUNTER
Patient returned call, s/w Stacia in office okay to see Dr. Proctor for concern due to Dr. Lazo is on Post-call today & OOO. Pt verbalized consent to schedule with Dr. Proctor; 12:30 appt 5/28.

## 2024-05-28 NOTE — PROGRESS NOTES
What we talked about today:    There are no Patient Instructions on file for this visit.        Assessment/Plan:    1. UTI symptoms  -     phenazopyridine (PYRIDIUM) 200 mg tablet; Take 1 tablet (200 mg total) by mouth 3 (three) times a day with meals for 2 days  -     nitrofurantoin (MACROBID) 100 mg capsule; Take 1 capsule (100 mg total) by mouth 2 (two) times a day for 7 days  2. Burning with urination  -     POCT urine dip  -     Urinalysis with microscopic  -     Urine culture  3. Hematuria, unspecified type  -     POCT urine dip  -     Urinalysis with microscopic  -     Urine culture  -     phenazopyridine (PYRIDIUM) 200 mg tablet; Take 1 tablet (200 mg total) by mouth 3 (three) times a day with meals for 2 days  -     nitrofurantoin (MACROBID) 100 mg capsule; Take 1 capsule (100 mg total) by mouth 2 (two) times a day for 7 days  4. Vaginal infection  -     fluconazole (DIFLUCAN) 150 mg tablet; Take 1 tablet (150 mg total) by mouth once a week for 2 doses        Subjective:      Patient ID: Nato Matta is a 19 y.o. female, presents today complaining of UTI sxs    HPI:    Pt states burning since the past 4 days and now worsening.  Seeing blood now in urine.  Admits to intimacy 3 days prior to sxs onset w/ female partner.  Did share toys.  Foul odor started 2 days ago w/ voiding.          The following portions of the patient's history were reviewed and updated as appropriate: allergies, current medications, past family history, past medical history, past social history, past surgical history, and problem list.      Review of Systems   Constitutional:  Negative for chills, fatigue and fever.   Respiratory: Negative.     Cardiovascular: Negative.    Gastrointestinal: Negative.    Endocrine: Negative.    Genitourinary: Negative.    Musculoskeletal: Negative.    Skin: Negative.    Allergic/Immunologic: Negative.    Neurological: Negative.    Hematological: Negative.    Psychiatric/Behavioral: Negative.                Objective:      /68 (BP Location: Right arm, Patient Position: Sitting, Cuff Size: Standard)   Wt 42 kg (92 lb 9.6 oz)   LMP 05/07/2024 (Exact Date)   BMI 17.79 kg/m²        Physical Exam  Vitals reviewed.   Constitutional:       General: She is not in acute distress.     Appearance: Normal appearance. She is not ill-appearing.   HENT:      Head: Normocephalic and atraumatic.   Eyes:      Extraocular Movements: Extraocular movements intact.   Abdominal:      General: There is no distension.      Palpations: Abdomen is soft.      Tenderness: There is abdominal tenderness.   Musculoskeletal:      Cervical back: Normal range of motion.   Skin:     General: Skin is warm and dry.   Neurological:      Mental Status: She is alert.   Psychiatric:         Mood and Affect: Mood normal.         Behavior: Behavior normal.         Thought Content: Thought content normal.         Judgment: Judgment normal.       TTP over suprapubic area.      NO CVA tenderness bilaterally.     GYN exam: deferred.     Wetprep was not performed today.              BRISA Proctor MD, FACOG

## 2024-05-30 ENCOUNTER — TELEPHONE (OUTPATIENT)
Dept: PSYCHIATRY | Facility: CLINIC | Age: 19
End: 2024-05-30

## 2024-05-30 DIAGNOSIS — J30.2 SEASONAL ALLERGIC RHINITIS, UNSPECIFIED TRIGGER: ICD-10-CM

## 2024-05-30 RX ORDER — CETIRIZINE HYDROCHLORIDE 10 MG/1
10 TABLET ORAL DAILY
Qty: 90 TABLET | Refills: 1 | Status: SHIPPED | OUTPATIENT
Start: 2024-05-30 | End: 2024-11-26

## 2024-06-03 NOTE — PSYCH
"Psychiatric Medication Management - Behavioral Health   Nato Matta 19 y.o. female MRN: [id number]    This note was not shared with the patient due to reasonable likelihood of causing patient harm     Reason for Visit: 3-week follow-up for medication management    Subjective:  Medication compliance: Yes  Medication side effects: lower dose of Cymbalta felt increased depression    Nato is a 19 year old female being seen today for 3-week follow-up appointment for medication management. Patient has psychiatric diagnoses including MDD and HASEEB. Patient is currently being observed on Cymbalta 90 mg daily and Seroquel 200 mg daily.  Nato is currently on the wait list for outpatient therapy at [facility]. No additional services in place at this time.  Nato was personally seen and evaluated today at the [facility] outpatient clinic for follow-up appointment for medication management.    Nato had messaged this provider through [facility] wanting to discuss borderline personality disorder at this appointment.  Nato believes a lot of things in her life lined up with borderline personality disorder.  She states when she decreased the Cymbalta to 60 mg she felt increased depression and went back to the 90 mg daily.  She reports that Seroquel helped with sleep but feels the Cymbalta 90 mg is not working great.  She reports her mood today is \"not great at all\".  Sleep has improved with the Seroquel increase.  Energy levels and motivation are low.  She reports her appetite is adequate however she only eats once per day.  She has passive thoughts of suicide but adamantly denies a plan or intent.  Denies AH/VH.  Still reports mood swings daily multiple times per day and irritability.  Denies any elevated moods.  No access to guns or weapons.  Nato reports her anxiety and 9/10 on the severity scale and depression a 9/10 on the severity scale.    Nato and writer discussed borderline personality disorder. " She feels this is a better fit for her as she does not feel she is bipolar. She states she researched about borderline personality disorder and states she has had to deal with not having a dad since childhood.  And has constant fear of abandonment. She states she does not have many relationships and the one she does have even with her mom they have to walk on eggshells around her as they are unstable and lakshmi.  She reports impulsive spending and reckless driving or going far away to random places on a whim.  She states if something bad happened in her life she would get angry and cry. She reports frequent SI's with threats to hurt herself in the past for example when she was 14 years old she reports cutting herself a lot.  She last was cutting 1 month ago.  Nato reports difficulty controlling her anger.  Writer and Nato discussed that there i no medication treatment for borderline personality disorder and that typically DBT therapy is recommended.  Nato is currently on the wait list for outpatient therapy.  Will cross titrate Cymbalta 90 mg, decrease Cymbalta to 60 mg for 1 week and 30 mg for another week and then stop.  Start venlafaxine 37.5 mg daily. Advised Nato if she would have increased suicidal thoughts with plan or intent she should seek care at the nearest emergency room.  She verbalized understanding and a [facility] safety plan was completed.    Advised Nato about serotonin Syndrome is a rare, but potentially severe condition caused by an increase in serotonin, a chemical found naturally in all of our bodies. This condition can be caused by the use of medicines that increase levels of serotonin in the brain, which include several types of antidepressants. Serotonin syndrome is most likely to happen within 24 hours after.  Of these, SSRI antidepressants are most commonly prescribed, and include: citalopram, escitalopram, fluoxetine, paroxetine and sertraline and the SNRI antidepressants,  venlafaxine and duloxetine. Other substances that may contribute include: cocaine, lithium, ecstasy, amphetamines, LSD, Satnam’s Wort and opioid pain killers, e.g. codeine and tramadol.  Serotonin syndrome results from increased amounts of serotonin in the body due to certain medications or drugs.    Signs and symptoms include: confusion, restlessness, mood changes, such as irritation or confusion, diarrhea, fever, muscle stiffness, especially in the legs, increased heart rate and increased blood pressure, sweating or shivering, arrhythmia (a fast or abnormal heartbeat), and seizures are some of the commonly noted symptoms associated with serotonin syndrome.    Review Of Systems:     Constitutional Negative   ENT Negative   Cardiovascular Negative   Respiratory Negative   Gastrointestinal Negative   Genitourinary Negative   Musculoskeletal Negative   Integumentary Negative   Neurological Negative   Endocrine Negative     Past Medical History:     Patient Active Problem List   Diagnosis    Other idiopathic scoliosis, thoracolumbar region    Generalized anxiety disorder    Dysthymic disorder    Asthma    Multiple allergies    Abnormal TSH    Current severe episode of major depressive disorder without psychotic features without prior episode (HCC)       Allergies:     Allergies   Allergen Reactions    Measles And Rubella Virus Vaccine Anaphylaxis, Hives and Shortness Of Breath    Cherry - Food Allergy     Peach [Prunus Persica]     Strawberry C [Ascorbate - Food Allergy] Itching     throat    Tomato - Food Allergy     Tree Extract Other (See Comments)     Done by blood test      Cucumber Extract - Food Allergy      Throat itchy      Nuts - Food Allergy Itching       Past Surgical History:   History reviewed. No pertinent surgical history.    Past Psychiatric History:   Past Inpatient Psychiatric Treatment:   No history of past inpatient psychiatric admissions  Past Outpatient Psychiatric Treatment:    Dr Isaura Barrera  "[place]  Past Suicide Attempts: no  Past Violent Behavior: yes  Past Psychiatric Medication Trials: Prozac, Zoloft, Lexapro, Cymbalta, and Seroquel    Family Psychiatric History:   Maternal Grandmother- mental illness on medication  Mom- undiagnosed bipolar    Social History:   Living situation- Mom and brother   Single - in a relationship  Kids- no  Occupation - In process of finding a new job  Hobbies- Not really- spending time with BF and dog  Still enjoys doing things    Substance Abuse History:    Denies use of alcohol, nicotine, tobacco, caffeine, marijuana, or other illicit drugs.      Traumatic History:    Denies history of physical, verbal, sexual, and emotional abuse.    The following portions of the patient's history were reviewed and updated as appropriate: allergies, current medications, past family history, past medical history, past social history, past surgical history, and problem list.    Objective:  There were no vitals filed for this visit.      Weight (last 2 days)       None            Mental status:  Appearance sitting comfortably in chair, dressed in casual clothing, cooperative with interview   Mood \" Not great at all\"    Affect Appears generally euthymic, stable, mood-congruent   Speech Normal rate, rhythm, and volume   Thought Processes [place]   Associations intact associations   Hallucinations Denies any auditory or visual hallucinations   Thought Content Fleeting passive suicidal ideation   Orientation Oriented to person, place, time, and situation   Recent and Remote Memory Grossly intact   Attention Span and Concentration Concentration intact   Intellect Appears to be of Average Intelligence   Insight Limited insight   Judgement judgment was limited   Muscle Strength Muscle strength and tone were normal   Language Within normal limits   Fund of Knowledge Age appropriate   Pain None     PHQ-A Depression Screening               Assessment/Plan:     Impression:  Major depressive " disorder  Generalized anxiety disorder  Mood Disorder  R/O  Bipolar Disorder     Continue Decrease Cymbalta to 60 mg for 1 week then decrease to 30 mg for one week then stop   Start Venlafaxine 37.5 mg daily for anxiety and depression   Continue Seroquel 200 mg daily for mood and sleep   Currently on the wait list for outpatient therapy at [facility]  Medical follow up with PCP as needed  Aware of 24 hour and weekend coverage for urgent situations accessed by calling [facility] Outpatient main practice number  Follow up in 3 weeks     Diagnoses:   Diagnoses and all orders for this visit:    Generalized anxiety disorder    Dysthymic disorder        Treatment Recommendations:      Risks, Benefits And Possible Side Effects Of Medications:  Risks, benefits, and possible side effects of medications explained to patient and family, they verbalize understanding and Reviewed risks/benefits and side effects of antidepressant medications including black box warning on antidepressants, patient and family verbalize understanding.    Controlled Medication Discussion: No records found for controlled prescriptions according to [place] Prescription Drug Monitoring Program.      Treatment Plan:Next treatment plan due 11/22/2024.     Visit Time    Visit Start Time: 8:55 AM  Visit Stop Time: 9:10 AM  Total Visit Duration:  15 minutes      ANSHU Renee  06/12/24

## 2024-06-12 ENCOUNTER — OFFICE VISIT (OUTPATIENT)
Dept: PSYCHIATRY | Facility: CLINIC | Age: 19
End: 2024-06-12
Payer: COMMERCIAL

## 2024-06-12 DIAGNOSIS — F41.1 GENERALIZED ANXIETY DISORDER: Primary | ICD-10-CM

## 2024-06-12 DIAGNOSIS — F34.1 DYSTHYMIC DISORDER: ICD-10-CM

## 2024-06-12 PROCEDURE — 99212 OFFICE O/P EST SF 10 MIN: CPT

## 2024-06-12 RX ORDER — VENLAFAXINE HYDROCHLORIDE 37.5 MG/1
37.5 CAPSULE, EXTENDED RELEASE ORAL DAILY
Qty: 30 CAPSULE | Refills: 0 | Status: SHIPPED | OUTPATIENT
Start: 2024-06-12

## 2024-06-12 NOTE — BH CRISIS PLAN
Client Name: Nato Matta       Client YOB: 2005    [facility] Safety Plan      Creation Date: 6/12/24    Created By: ANSHU Renee       Step 1: Warning Signs:   Warning Signs   cry   [place]            Step 2: Internal Coping Strategies:   Internal Coping Strategies   being with Dog   color            Step 3: People and social settings that provide distraction:   Name Contact Information   Mom in cell phone            Step 4: People whom I can ask for help during a crisis:      Name Contact Information    Mom in phone    sometimes Boyfriend in phone      Step 5: Professionals or agencies I can contact during a crisis:      Clinican/Agency Name Phone Emergency Contact    [facility]        [phone]      Local Emergency Department Emergency Department Phone Emergency Department Address    911          Crisis Phone Numbers:   Suicide Prevention Lifeline: Call or Text  345 Crisis Text Line: Text HOME to 702-836   Please note: Some [facility] do not have a separate number for Child/Adolescent specific crisis. If your county is not listed under Child/Adolescent, please call the adult number for your county      Adult Crisis Numbers: Child/Adolescent Crisis Numbers   [facility]: [phone] [facility]: [phone]   [facility]: [phone] [facility]: [phone]   [facility]: [phone] [place]: [phone]   [facility]: [phone] [place]: [phone]   [facility]: [phone]   [facility]: [phone]   [facility]: [phone]   [facility]: [phone] (daytime) [phone] (after hours, weekends, holidays)      Step 6: Making the environment safer (plan for lethal means safety):      Optional: What is most important to me and worth living for?      [place] Safety Plan. Diane Rodriguez and Jakub Quiñones. Used with permission of the authors.

## 2024-07-01 DIAGNOSIS — F41.1 GENERALIZED ANXIETY DISORDER: ICD-10-CM

## 2024-07-01 DIAGNOSIS — F32.2 CURRENT SEVERE EPISODE OF MAJOR DEPRESSIVE DISORDER WITHOUT PSYCHOTIC FEATURES WITHOUT PRIOR EPISODE (HCC): ICD-10-CM

## 2024-07-03 RX ORDER — QUETIAPINE FUMARATE 100 MG/1
TABLET, FILM COATED ORAL
Qty: 60 TABLET | Refills: 0 | Status: SHIPPED | OUTPATIENT
Start: 2024-07-03

## 2024-07-07 ENCOUNTER — PATIENT MESSAGE (OUTPATIENT)
Dept: FAMILY MEDICINE CLINIC | Facility: CLINIC | Age: 19
End: 2024-07-07

## 2024-07-08 ENCOUNTER — TELEPHONE (OUTPATIENT)
Dept: PSYCHIATRY | Facility: CLINIC | Age: 19
End: 2024-07-08

## 2024-07-08 NOTE — TELEPHONE ENCOUNTER
Writer left voicemail for client regarding rescheduling appointment and potential for transportation services.

## 2024-07-08 NOTE — PATIENT COMMUNICATION
Patient had reached out to the office, to go ahead and schedule an appointment.     Went ahead and transferred patient over to [place], to get that appointment made, as no openings on my end.

## 2024-07-11 ENCOUNTER — OFFICE VISIT (OUTPATIENT)
Dept: FAMILY MEDICINE CLINIC | Facility: CLINIC | Age: 19
End: 2024-07-11
Payer: COMMERCIAL

## 2024-07-11 VITALS
HEIGHT: 61 IN | RESPIRATION RATE: 15 BRPM | TEMPERATURE: 98 F | WEIGHT: 93.6 LBS | HEART RATE: 76 BPM | OXYGEN SATURATION: 98 % | DIASTOLIC BLOOD PRESSURE: 62 MMHG | BODY MASS INDEX: 17.67 KG/M2 | SYSTOLIC BLOOD PRESSURE: 90 MMHG

## 2024-07-11 DIAGNOSIS — M65.9 TENOSYNOVITIS OF RIGHT HAND: Primary | ICD-10-CM

## 2024-07-11 DIAGNOSIS — Z88.9 MULTIPLE ALLERGIES: ICD-10-CM

## 2024-07-11 DIAGNOSIS — M65.9 TENOSYNOVITIS OF RIGHT FOREARM: ICD-10-CM

## 2024-07-11 PROCEDURE — 99213 OFFICE O/P EST LOW 20 MIN: CPT | Performed by: FAMILY MEDICINE

## 2024-07-11 RX ORDER — NAPROXEN 500 MG/1
500 TABLET ORAL 2 TIMES DAILY WITH MEALS
Qty: 60 TABLET | Refills: 0 | Status: SHIPPED | OUTPATIENT
Start: 2024-07-11

## 2024-07-11 NOTE — PROGRESS NOTES
Ambulatory Visit  Name: Nato Matta      : 2005      MRN: [id number]  Encounter Provider: Brie Rubio DO  Encounter Date: 2024   Encounter department: [facility]    Assessment & Plan   1. Tenosynovitis of right hand  Assessment & Plan:  Patient's symptoms are consistent with a de Quervain's tenosynovitis of the right thumb and wrist.  We will do the trial of the thumb spica splint as well as temporary use of an anti-inflammatory to help in addition, we will refer her to occupational therapy as indicated.  Will monitor closely.  Orders:  -     Ambulatory Referral to Occupational Therapy; Future  -     naproxen (Naprosyn) 500 mg tablet; Take 1 tablet (500 mg total) by mouth 2 (two) times a day with meals  -     Elastic Bandages & Supports (Thumb Stabilizer/Right Small) MISC; Use daily  2. Tenosynovitis of right forearm  -     Ambulatory Referral to Occupational Therapy; Future  -     naproxen (Naprosyn) 500 mg tablet; Take 1 tablet (500 mg total) by mouth 2 (two) times a day with meals  -     Elastic Bandages & Supports (Thumb Stabilizer/Right Small) MISC; Use daily  3. Multiple allergies  Assessment & Plan:  She is interested in undergoing an evaluation with an allergist for her multiple allergies.  She will schedule this in the near future.  Orders:  -     Ambulatory Referral to Allergy; Future     Chief Complaint   Patient presents with   • Arm Pain     Right wrist/arm pain tingling and painful       History of Present Illness     Nato Matta is a 19 y.o. female who presents today for evaluation of persistent pain in her right wrist and right hand that is not subsiding.  She has pain I the right wrist and hand for 2 months- it comes and goes- once a day to multiple times a day- lasting a few minutes- she wakes up with it- it will last longer in the morning.  There is some trouble with gripping things at time and there is occasional tingling into her thumb and wrist.   It is confined to the right wrist and thumb.  There is some radiation to her mid forearm.  It is not constant.  It happens intermittently.  There is no weakness.  There is no swelling or deformity.      Arm Pain   The incident occurred more than 1 week ago. There was no injury mechanism. The pain is present in the right hand and right wrist. The quality of the pain is described as aching and burning. The pain is at a severity of 8/10. The pain is moderate. The pain has been Intermittent since the incident. Associated symptoms include muscle weakness and tingling. Pertinent negatives include no chest pain or numbness.     Review of Systems   Constitutional: Negative.    HENT: Negative.     Eyes: Negative.    Respiratory: Negative.     Cardiovascular: Negative.  Negative for chest pain.   Gastrointestinal: Negative.    Endocrine: Negative.    Genitourinary: Negative.    Musculoskeletal:  Positive for myalgias.   Skin: Negative.    Allergic/Immunologic: Negative.    Neurological:  Positive for tingling. Negative for numbness.   Hematological: Negative.    Psychiatric/Behavioral: Negative.       Past Medical History:   Diagnosis Date   • Allergic    • Asthma    • Depression      No past surgical history on file.  Family History   Problem Relation Age of Onset   • No Known Problems Mother    • Addiction problem Father         Alcoholic   • Asthma Sister    • Asthma Maternal Grandmother    • Diabetes Maternal Grandmother    • Heart disease Maternal Grandmother      Social History     Tobacco Use   • Smoking status: Every Day     Types: E-Cigarettes   • Smokeless tobacco: Never   Vaping Use   • Vaping status: Every Day   • Substances: Nicotine   Substance and Sexual Activity   • Alcohol use: Never   • Drug use: Never   • Sexual activity: Yes     Partners: Female     Birth control/protection: None     Current Outpatient Medications on File Prior to Visit   Medication Sig   • cetirizine (ZyrTEC) 10 mg tablet Take 1 tablet  "(10 mg total) by mouth daily   • EPINEPHrine (EPIPEN) 0.3 mg/0.3 mL SOAJ Inject 0.3 mL (0.3 mg total) into a muscle once for 1 dose   • QUEtiapine (SEROquel) 100 mg tablet Take 2 tablets at bedtime (for a total of 200 mg) daily   • venlafaxine (EFFEXOR-XR) 37.5 mg 24 hr capsule Take 1 capsule (37.5 mg total) by mouth daily   • DULoxetine (CYMBALTA) 60 mg delayed release capsule Take 1 capsule (60 mg total) by mouth daily     Allergies   Allergen Reactions   • Measles And Rubella Virus Vaccine Anaphylaxis, Hives and Shortness Of Breath   • Dasilva - Food Allergy    • Peach [Prunus Persica]    • Strawberry C [Ascorbate - Food Allergy] Itching     throat   • Tomato - Food Allergy    • Tree Extract Other (See Comments)     Done by blood test     • Cucumber Extract - Food Allergy      Throat itchy     • Nuts - Food Allergy Itching     Immunization History   Administered Date(s) Administered   • DTaP,unspecified 2005, 2004, 2004, 08/18/2005, 10/14/2009   • HPV 03/30/2015, 06/02/2015   • HPV9 01/04/2023   • Hep A, ped/adol, 2 dose 2004, 01/20/2010, 01/04/2023   • Hep B, Adolescent or Pediatric 2005, 2005, 2004   • HiB 2005, 2005, 2004, 05/19/2005   • INFLUENZA 10/05/2010, 08/24/2016, 01/31/2018   • IPV 2004, 2004, 05/15/2005, 10/14/2009   • Influenza, injectable, quadrivalent, preservative free 0.5 mL 02/12/2020   • MMR 05/19/2005   • Meningococcal ACWY, unspecified 03/30/2015   • Meningococcal B, Recombinant (TRUMENBA) 10/21/2021   • Meningococcal MCV4P 02/12/2020   • Pneumococcal Conjugate 13-Valent 2004, 2004, 2004, 02/15/2005   • Tdap 03/30/2015   • Varicella 05/15/2005, 11/04/2009     Objective     BP 90/62 (BP Location: Right arm, Patient Position: Sitting, Cuff Size: Standard)   Pulse 76   Temp 98 °F (36.7 °C) (Tympanic)   Resp 15   Ht 5' 0.5\" (1.537 m)   Wt 42.5 kg (93 lb 9.6 oz)   LMP 07/08/2024   SpO2 98%   BMI 17.98 " kg/m²     Physical Exam  Constitutional:       General: She is not in acute distress.     Appearance: Normal appearance. She is well-developed. She is not diaphoretic.   HENT:      Head: Normocephalic and atraumatic.      Right Ear: Tympanic membrane, ear canal and external ear normal.      Left Ear: Tympanic membrane, ear canal and external ear normal.      Nose: Nose normal.      Mouth/Throat:      Mouth: Mucous membranes are moist.      Pharynx: Oropharynx is clear. No oropharyngeal exudate.   Eyes:      General: No scleral icterus.        Right eye: No discharge.         Left eye: No discharge.      Extraocular Movements: Extraocular movements intact.      Pupils: Pupils are equal, round, and reactive to light.   Neck:      Thyroid: No thyromegaly.      Vascular: No JVD.      Trachea: No tracheal deviation.   Cardiovascular:      Rate and Rhythm: Normal rate and regular rhythm.      Heart sounds: Normal heart sounds. No murmur heard.     No friction rub. No gallop.   Pulmonary:      Effort: Pulmonary effort is normal. No respiratory distress.      Breath sounds: Normal breath sounds. No wheezing or rales.   Chest:      Chest wall: No tenderness.   Abdominal:      General: Bowel sounds are normal. There is no distension.      Palpations: Abdomen is soft. There is no mass.      Tenderness: There is no abdominal tenderness. There is no guarding or rebound.      Hernia: No hernia is present.   Musculoskeletal:         General: Tenderness present. No deformity. Normal range of motion.      Right wrist: Tenderness present. No swelling, deformity, effusion, lacerations, bony tenderness, snuff box tenderness or crepitus. Normal range of motion. Normal pulse.      Right hand: Tenderness present. No swelling, deformity or lacerations. Normal range of motion. Normal strength. Normal sensation. There is no disruption of two-point discrimination. Normal capillary refill. Normal pulse.      Cervical back: Normal range of  motion and neck supple.      Comments: Positive Finkelstein test in the right hand and wrist.   Lymphadenopathy:      Cervical: No cervical adenopathy.   Skin:     General: Skin is warm and dry.      Coloration: Skin is not pale.      Findings: No erythema or rash.   Neurological:      Mental Status: She is alert and oriented to person, place, and time.      Cranial Nerves: No cranial nerve deficit.      Sensory: No sensory deficit.      Motor: No abnormal muscle tone.      Coordination: Coordination normal.      Deep Tendon Reflexes: Reflexes normal.   Psychiatric:         Mood and Affect: Mood normal.         Behavior: Behavior normal.         Thought Content: Thought content normal.         Judgment: Judgment normal.       Administrative Statements   I have spent a total time of 20 minutes in caring for this patient on the day of the visit/encounter including Diagnostic results, Prognosis, Risks and benefits of tx options, Instructions for management, Patient and family education, Importance of tx compliance, Risk factor reductions, Impressions, Counseling / Coordination of care, Documenting in the medical record, and Reviewing / ordering tests, medicine, procedures  .

## 2024-07-12 PROBLEM — M65.941 TENOSYNOVITIS OF RIGHT HAND: Status: ACTIVE | Noted: 2024-07-12

## 2024-07-12 PROBLEM — M65.9 TENOSYNOVITIS OF RIGHT HAND: Status: ACTIVE | Noted: 2024-07-12

## 2024-07-12 NOTE — ASSESSMENT & PLAN NOTE
Patient's symptoms are consistent with a de Quervain's tenosynovitis of the right thumb and wrist.  We will do the trial of the thumb spica splint as well as temporary use of an anti-inflammatory to help in addition, we will refer her to occupational therapy as indicated.  Will monitor closely.

## 2024-07-12 NOTE — ASSESSMENT & PLAN NOTE
She is interested in undergoing an evaluation with an allergist for her multiple allergies.  She will schedule this in the near future.

## 2024-07-17 ENCOUNTER — CONSULT (OUTPATIENT)
Dept: DERMATOLOGY | Facility: CLINIC | Age: 19
End: 2024-07-17
Payer: COMMERCIAL

## 2024-07-17 VITALS — BODY MASS INDEX: 18.61 KG/M2 | WEIGHT: 98.6 LBS | HEIGHT: 61 IN

## 2024-07-17 DIAGNOSIS — L70.0 ACNE VULGARIS: Primary | ICD-10-CM

## 2024-07-17 DIAGNOSIS — Z12.83 ENCOUNTER FOR SCREENING FOR MALIGNANT NEOPLASM OF SKIN: ICD-10-CM

## 2024-07-17 PROCEDURE — 99244 OFF/OP CNSLTJ NEW/EST MOD 40: CPT | Performed by: STUDENT IN AN ORGANIZED HEALTH CARE EDUCATION/TRAINING PROGRAM

## 2024-07-17 RX ORDER — SPIRONOLACTONE 25 MG/1
TABLET ORAL
Qty: 60 TABLET | Refills: 3 | Status: SHIPPED | OUTPATIENT
Start: 2024-07-17

## 2024-07-17 NOTE — PROGRESS NOTES
"[facility]atology [facility] Note     Patient Name: Nato Matta  Encounter Date: 07/17/2024     Have you been cared for by a [facility] Dermatologist in the last 3 years and, if so, which description applies to you?    NO.   I am considered a \"new\" patient and must complete all patient intake questions. I am FEMALE/of child-bearing potential.    REVIEW OF SYSTEMS:  Have you recently had or currently have any of the following? Recent fever or chills? No  Any non-healing wound? No  Are you pregnant or planning to become pregnant? No  Are you currently or planning to be nursing or breast feeding? No   PAST MEDICAL HISTORY:  Have you personally ever had or currently have any of the following?  If \"YES,\" then please provide more detail. Skin cancer (such as Melanoma, Basal Cell Carcinoma, Squamous Cell Carcinoma?  No  Tuberculosis, HIV/AIDS, Hepatitis B or C: No  Radiation Treatment No   HISTORY OF IMMUNOSUPPRESSION:   Do you have a history of any of the following:  Systemic Immunosuppression such as Diabetes, Biologic or Immunotherapy, Chemotherapy, Organ Transplantation, Bone Marrow Transplantation?  No    Answering \"YES\" requires the addition of the dotphrase \"IMMUNOSUPPRESSED\" as the first diagnosis of the patient's visit.   FAMILY HISTORY:  Any \"first degree relatives\" (parent, brother, sister, or child) with the following?    Skin Cancer, Pancreatic or Other Cancer? No   PATIENT EXPERIENCE:    Do you want the Dermatologist to perform a COMPLETE skin exam today including a clinical examination under the \"bra and underwear\" areas?  NO  If necessary, do we have your permission to call and leave a detailed message on your Preferred Phone number that includes your specific medical information?  Yes      Allergies   Allergen Reactions    Measles And Rubella Virus Vaccine Anaphylaxis, Hives and Shortness Of Breath    Cherry - Food Allergy     Peach [Prunus Persica]     Strawberry C [Ascorbate - Food Allergy] Itching     " "throat    Tomato - Food Allergy     Tree Extract Other (See Comments)     Done by blood test      Cucumber Extract - Food Allergy      Throat itchy      Nuts - Food Allergy Itching      Current Outpatient Medications:     cetirizine (ZyrTEC) 10 mg tablet, Take 1 tablet (10 mg total) by mouth daily, Disp: 90 tablet, Rfl: 1    DULoxetine (CYMBALTA) 60 mg delayed release capsule, Take 1 capsule (60 mg total) by mouth daily, Disp: 30 capsule, Rfl: 0    Elastic Bandages & Supports (Thumb Stabilizer/Right Small) MISC, Use daily, Disp: 1 each, Rfl: 0    EPINEPHrine (EPIPEN) 0.3 mg/0.3 mL SOAJ, Inject 0.3 mL (0.3 mg total) into a muscle once for 1 dose, Disp: 0.6 mL, Rfl: 0    naproxen (Naprosyn) 500 mg tablet, Take 1 tablet (500 mg total) by mouth 2 (two) times a day with meals, Disp: 60 tablet, Rfl: 0    QUEtiapine (SEROquel) 100 mg tablet, Take 2 tablets at bedtime (for a total of 200 mg) daily, Disp: 60 tablet, Rfl: 0    venlafaxine (EFFEXOR-XR) 37.5 mg 24 hr capsule, Take 1 capsule (37.5 mg total) by mouth daily, Disp: 30 capsule, Rfl: 0          Whom besides the patient is providing clinical information about today's encounter?   NO ADDITIONAL HISTORIAN (patient alone provided history)    Physical Exam and Assessment/Plan by Diagnosis:    ACNE VULGARIS (\"COMMON ACNE\")    Physical Exam:  Anatomic Location Affected:  Face (primarily chin/jawline), chest, back  Morphological Description: Scattered erythematous papules, pustules, cystic lesions  Pertinent Positives:  Pertinent Negatives:    Additional History of Present Condition:  New patient. Using Tana face wash and moisturizer.     History notable for acne that flares around menses  Denies hirsutism, irregular menses  Discussed that history and physical are consistent with hormonal acne   Educated that hormonal acne does particularly well with medications that target hormones, including spironolactone and OCP. Patient prefers to avoid OCP at this time.  Denies family " "history of breast cancer,, liver disease, kidney disease, hyperkalemia  Patient's blood pressure does run lower (90s/60s) Discussed at length need for close self monitoring and signs of low blood pressure. Educated to take with large amounts of water and stop if experiencing dizziness or light headedness.   Educated that unless over age 45 or with a history of renal/liver disease, hyperkalemia, or medication interactions, evidence-based medicine does not support routine lab studies in patients on spironolactone.   The potential link to estrogen-sensitive cancers in high dose animal studies such as breast cancer was discussed and the patient was counseled that the most recent meta analyses on spironolactone in humans on typical dosing has not demonstrated this risk.       Discussed that treatment is directed at improving skin appearance and reducing the likelihood of scarring. Discussed theraputic ladder including topical OTC treatments, topical prescriptions, and oral medications. Discussed side effects as noted below.    Plan today:  Follow up in 3 months     AM:  - Start benzoyl peroxide cleanser (over the counter products like Panoxyl, Cetaphil, CeraVe and Neutrogena contain this product listed under \"active ingredients\". Try to find Benzoyl Peroxide 4% or less). Use on face and body. Leave on for 5 mins before rinsing and rinse well (bleaches clothing)  - Start non-comedogenic moisturizer such as CeraVe, Cetaphil or Vanicream.   - Start SPF 30 or greater (can be a lotion with SPF like CeraVe AM)       PM:  - Double cleanse (micellar water followed by gentle cleanser)  - Start tretinoin 0.025% cream nightly to face followed by non-comedogenic moisturizer. If retinoid is too drying, may employ the \"sandwich method.\" To do this, apply layer of non-comedogenic moisturizer, followed by layer of retinoid, followed by another layer of non-comedogenic moisturizer.     ORAL:   Start spironolactone 25 mg PO daily. " Educated to start with 25 mg nightly for two weeks then increase to BID dosing if tolerated. S/E reviewed including menstrual irregularity, breast tenderness, headaches, GI upset, K+ retention, palpitations, teratogenicity/feminization of male fetus. Handout provided.        Call with any questions or concerns before next follow-up visit; do not stop medications abruptly without consulting provider. Call our office at [phone] (SKIN).  It is better to be safe than to be sorry!        Scribe Attestation      I,:  Lazara Mojica MA am acting as a scribe while in the presence of the attending physician.:       I,:  Serafin Salcido MD personally performed the services described in this documentation    as scribed in my presence.:

## 2024-07-18 DIAGNOSIS — R39.9 UTI SYMPTOMS: Primary | ICD-10-CM

## 2024-07-18 RX ORDER — NITROFURANTOIN 25; 75 MG/1; MG/1
100 CAPSULE ORAL 2 TIMES DAILY
Qty: 10 CAPSULE | Refills: 3 | Status: SHIPPED | OUTPATIENT
Start: 2024-07-18 | End: 2024-07-23

## 2024-07-18 RX ORDER — PHENAZOPYRIDINE HYDROCHLORIDE 200 MG/1
200 TABLET, FILM COATED ORAL
Qty: 6 TABLET | Refills: 3 | Status: SHIPPED | OUTPATIENT
Start: 2024-07-18 | End: 2024-07-20

## 2024-07-19 ENCOUNTER — TELEPHONE (OUTPATIENT)
Age: 19
End: 2024-07-19

## 2024-07-19 NOTE — TELEPHONE ENCOUNTER
PA for Tretinoin 0.05% cream     Submitted via    [x]CMM-KEY [id number]  []Surescripts-Case ID #   []Faxed to plan   []Other website   []Phone call Case ID #     Office notes sent, clinical questions answered. Awaiting determination    Turnaround time for your insurance to make a decision on your Prior Authorization can take 7-21 business days.

## 2024-07-23 DIAGNOSIS — R79.89 ABNORMAL TSH: Primary | ICD-10-CM

## 2024-07-29 ENCOUNTER — NURSE TRIAGE (OUTPATIENT)
Age: 19
End: 2024-07-29

## 2024-07-29 NOTE — TELEPHONE ENCOUNTER
"Patient called in stating that she's having frequency and pain with urination. Pt reporting this started for about 2 weeks and was treated for a UTI with macrobid and pt reporting that once medication was stopped her symptoms came back. Pt reporting blood in the urine.     Attempted to find the patient an appt, pt was warm transferred to [place] for further assistance with scheduling.            Reason for Disposition   Patient wants to be seen    Answer Assessment - Initial Assessment Questions  1. SYMPTOM: \"What's the main symptom you're concerned about?\" (e.g., frequency, incontinence)      Frequency, pain with urination  2. ONSET: \"When did the  symptoms  start?\"      2 weeks ago  3. PAIN: \"Is there any pain?\" If Yes, ask: \"How bad is it?\" (Scale: 1-10; mild, moderate, severe)      8/10  4. CAUSE: \"What do you think is causing the symptoms?\"      Pt reporting UTI  5. OTHER SYMPTOMS: \"Do you have any other symptoms?\" (e.g., fever, flank pain, blood in urine, pain with urination)      Pt c/o pain with urination. Pt reporting blood in the urine pt denies, fever, flank pain,   6. PREGNANCY: \"Is there any chance you are pregnant?\" \"When was your last menstrual period?\"      Pt denies, LMP 7/8/24    Protocols used: Urinary Symptoms-ADULT-[place]    "

## 2024-07-30 ENCOUNTER — OFFICE VISIT (OUTPATIENT)
Age: 19
End: 2024-07-30
Payer: COMMERCIAL

## 2024-07-30 VITALS — DIASTOLIC BLOOD PRESSURE: 68 MMHG | WEIGHT: 99 LBS | SYSTOLIC BLOOD PRESSURE: 100 MMHG | BODY MASS INDEX: 19.02 KG/M2

## 2024-07-30 DIAGNOSIS — B37.31 YEAST VAGINITIS: ICD-10-CM

## 2024-07-30 DIAGNOSIS — R39.9 UTI SYMPTOMS: Primary | ICD-10-CM

## 2024-07-30 PROCEDURE — 99213 OFFICE O/P EST LOW 20 MIN: CPT | Performed by: OBSTETRICS & GYNECOLOGY

## 2024-07-30 RX ORDER — SULFAMETHOXAZOLE AND TRIMETHOPRIM 800; 160 MG/1; MG/1
1 TABLET ORAL EVERY 12 HOURS SCHEDULED
Qty: 21 TABLET | Refills: 0 | Status: SHIPPED | OUTPATIENT
Start: 2024-07-30 | End: 2024-08-06

## 2024-07-30 RX ORDER — PHENAZOPYRIDINE HYDROCHLORIDE 200 MG/1
200 TABLET, FILM COATED ORAL
Qty: 6 TABLET | Refills: 1 | Status: SHIPPED | OUTPATIENT
Start: 2024-07-30 | End: 2024-08-01

## 2024-07-30 RX ORDER — FLUCONAZOLE 150 MG/1
150 TABLET ORAL WEEKLY
Qty: 3 TABLET | Refills: 0 | Status: SHIPPED | OUTPATIENT
Start: 2024-07-30 | End: 2024-08-14

## 2024-07-30 NOTE — PROGRESS NOTES
What we talked about today:    Patient Instructions    Recurrent UTI:  unfortunately last Ucx was contaminated.  Pt took macrobid but symptoms are back.  Bactrim x 7 days and may need to extend once a day from day 8-14, depending on urine culture.    If UTI is adequately treated and has recurrent bouts, will need to send to urology for evaluation.    Discussed untreated UTIs can lead to a kidney infection and possible sepsis.    Start probiotics and take daily for at least 2-3months.            Assessment/Plan:    1. UTI symptoms  -     Urinalysis with microscopic  -     Urine culture  -     POCT wet mount  -     phenazopyridine (PYRIDIUM) 200 mg tablet; Take 1 tablet (200 mg total) by mouth 3 (three) times a day with meals for 2 days  -     sulfamethoxazole-trimethoprim (BACTRIM DS) 800-160 mg per tablet; Take 1 tablet by mouth every 12 (twelve) hours for 7 days  -     Chlamydia/GC amplified DNA by PCR; Future; Expected date: 08/13/2024  -     Chlamydia/GC amplified DNA by PCR  2. Yeast vaginitis  -     fluconazole (DIFLUCAN) 150 mg tablet; Take 1 tablet (150 mg total) by mouth once a week for 3 doses          Subjective:      Patient ID: Nato Matta is a 19 y.o. female, presents today complaining of UTI sxs    HPI:    Pt states the UTI sxs started again after finishing macrobid, 2 days after.  Now having UTI sxs again and pressure.  UTI sxs are less than when she came in last time.  Pt notes a foul odor to her urine.  Denies fevers or chills.       The following portions of the patient's history were reviewed and updated as appropriate: allergies, current medications, past family history, past medical history, past social history, past surgical history, and problem list.      Review of Systems   Constitutional:  Negative for chills, fatigue and fever.   Respiratory: Negative.     Cardiovascular: Negative.    Gastrointestinal: Negative.    Endocrine: Negative.    Genitourinary:  Positive for dysuria and frequency.    Musculoskeletal: Negative.    Skin: Negative.    Allergic/Immunologic: Negative.    Neurological: Negative.    Hematological: Negative.    Psychiatric/Behavioral: Negative.               Objective:      /68 (BP Location: Left arm, Patient Position: Sitting, Cuff Size: Standard)   Wt 44.9 kg (99 lb)   LMP 07/08/2024   BMI 19.02 kg/m²        Physical Exam  Vitals reviewed.   Constitutional:       General: She is not in acute distress.     Appearance: Normal appearance. She is not ill-appearing.   HENT:      Head: Normocephalic and atraumatic.   Eyes:      Extraocular Movements: Extraocular movements intact.   Musculoskeletal:      Cervical back: Normal range of motion.   Skin:     General: Skin is warm and dry.   Neurological:      Mental Status: She is alert.   Psychiatric:         Mood and Affect: Mood normal.         Behavior: Behavior normal.         Thought Content: Thought content normal.         Judgment: Judgment normal.           GYN exam: deferred.     Suprapubic tenderness noted.  Absent CVAT  bilaterally.             BRISA Proctor MD, FACOG

## 2024-07-30 NOTE — PATIENT INSTRUCTIONS
Recurrent UTI:  unfortunately last Ucx was contaminated.  Pt took macrobid but symptoms are back.  Bactrim x 7 days and may need to extend once a day from day 8-14, depending on urine culture.    If UTI is adequately treated and has recurrent bouts, will need to send to urology for evaluation.    Discussed untreated UTIs can lead to a kidney infection and possible sepsis.    Start probiotics and take daily for at least 2-3months.

## 2024-08-02 ENCOUNTER — TELEPHONE (OUTPATIENT)
Age: 19
End: 2024-08-02

## 2024-08-02 DIAGNOSIS — F41.1 GENERALIZED ANXIETY DISORDER: ICD-10-CM

## 2024-08-02 DIAGNOSIS — F32.2 CURRENT SEVERE EPISODE OF MAJOR DEPRESSIVE DISORDER WITHOUT PSYCHOTIC FEATURES WITHOUT PRIOR EPISODE (HCC): ICD-10-CM

## 2024-08-05 RX ORDER — QUETIAPINE FUMARATE 100 MG/1
TABLET, FILM COATED ORAL
Qty: 60 TABLET | Refills: 0 | Status: SHIPPED | OUTPATIENT
Start: 2024-08-05

## 2024-08-08 ENCOUNTER — TELEPHONE (OUTPATIENT)
Age: 19
End: 2024-08-08

## 2024-08-08 DIAGNOSIS — B37.31 YEAST VAGINITIS: ICD-10-CM

## 2024-08-08 DIAGNOSIS — R39.9 UTI SYMPTOMS: Primary | ICD-10-CM

## 2024-08-08 DIAGNOSIS — N34.2 INFECTIVE URETHRITIS: Primary | ICD-10-CM

## 2024-08-08 RX ORDER — LEVOFLOXACIN 500 MG/1
500 TABLET, FILM COATED ORAL EVERY 24 HOURS
Qty: 7 TABLET | Refills: 0 | Status: SHIPPED | OUTPATIENT
Start: 2024-08-08 | End: 2024-08-15

## 2024-08-08 RX ORDER — FLUCONAZOLE 150 MG/1
150 TABLET ORAL WEEKLY
Qty: 2 TABLET | Refills: 0 | Status: SHIPPED | OUTPATIENT
Start: 2024-08-08 | End: 2024-08-16

## 2024-08-29 ENCOUNTER — OFFICE VISIT (OUTPATIENT)
Dept: PSYCHIATRY | Facility: CLINIC | Age: 19
End: 2024-08-29
Payer: COMMERCIAL

## 2024-08-29 DIAGNOSIS — F41.1 GENERALIZED ANXIETY DISORDER: Primary | ICD-10-CM

## 2024-08-29 DIAGNOSIS — F32.2 CURRENT SEVERE EPISODE OF MAJOR DEPRESSIVE DISORDER WITHOUT PSYCHOTIC FEATURES WITHOUT PRIOR EPISODE (HCC): ICD-10-CM

## 2024-08-29 PROCEDURE — 99212 OFFICE O/P EST SF 10 MIN: CPT

## 2024-08-29 RX ORDER — VENLAFAXINE HYDROCHLORIDE 75 MG/1
75 CAPSULE, EXTENDED RELEASE ORAL DAILY
Qty: 30 CAPSULE | Refills: 0 | Status: SHIPPED | OUTPATIENT
Start: 2024-08-29

## 2024-08-29 RX ORDER — FLUCONAZOLE 150 MG/1
TABLET ORAL
COMMUNITY
Start: 2024-08-16

## 2024-08-29 NOTE — PSYCH
"Psychiatric Medication Management - Behavioral Health   Nato Matta 19 y.o. female MRN: [id number]    This note was not shared with the patient due to reasonable likelihood of causing patient harm     Reason for Visit:  follow-up for medication management    Subjective:  Medication compliance: Yes  Medication side effects: No    Nato is a 19 year old female being seen today for  follow-up appointment for medication management. Patient has psychiatric diagnoses including MDD and HASEEB. Patient is currently being observed on Effexor 37.5 mg and Seroquel 200 mg daily.  Nato is currently on the wait list for outpatient therapy at [facility]. No additional services in place at this time.       Nato reports medication compliance and denies any side effects at this time.  She reports she does not feel much of a difference from the Effexor 37.5 mg and reports her mood as \"more angry\".  Sleep is adequate, however energy levels and motivation remain low.  She reports her appetite has decreased.  Nato denies suicidal thoughts, plan, or intent.  She denies HI, AH, or VH.  She endorses verbal aggression towards her boyfriend and mom intermittently.  Nato reports she is irritable \"often\".  She denies frequent crying spells or any periods of elevated mood.  She reports both her anxiety and depression an 8/10 on the severity scale with 10 being the most severe.  She voices her frustration about waiting for a therapist so long after she was established with somebody here at [facility].  Nato states she is working at [employer] as a  which is \"pretty decent\".  She states she is thinking of starting  school which is a 3-month program.  She is interested in Botox administration.  Will increase Effexor to 75 mg daily    Review Of Systems:     Constitutional Negative   ENT Negative   Cardiovascular Negative   Respiratory Negative   Gastrointestinal Negative   Genitourinary Negative "   Musculoskeletal Negative   Integumentary Negative   Neurological Negative   Endocrine Negative     Past Medical History:     Patient Active Problem List   Diagnosis    Other idiopathic scoliosis, thoracolumbar region    Generalized anxiety disorder    Dysthymic disorder    Asthma    Multiple allergies    Abnormal TSH    Current severe episode of major depressive disorder without psychotic features without prior episode (HCC)    Tenosynovitis of right hand       Allergies:     Allergies   Allergen Reactions    Measles And Rubella Virus Vaccine Anaphylaxis, Hives and Shortness Of Breath    Cherry - Food Allergy     Peach [Prunus Persica]     Strawberry C [Ascorbate - Food Allergy] Itching     throat    Tomato - Food Allergy     Tree Extract Other (See Comments)     Done by blood test      Cucumber Extract - Food Allergy      Throat itchy      Nuts - Food Allergy Itching       Past Surgical History:   No past surgical history on file.    Past Psychiatric History:   Past Inpatient Psychiatric Treatment:   No history of past inpatient psychiatric admissions  Past Outpatient Psychiatric Treatment:    Dr Isaura Calabrese  Past Suicide Attempts: no  Past Violent Behavior: yes  Past Psychiatric Medication Trials: Prozac, Zoloft, Lexapro, Cymbalta, and Seroquel    Family Psychiatric History:   Maternal Grandmother- mental illness on medication  Mom- undiagnosed bipolar    Social History:   Living situation- Mom and brother   Single - in a relationship  Kids- no  Occupation - In process of finding a new job  Hobbies- Not really- spending time with BF and dog  Still enjoys doing things    Substance Abuse History:    Denies use of alcohol, nicotine, tobacco, caffeine, marijuana, or other illicit drugs.      Traumatic History:    Denies history of physical, verbal, sexual, and emotional abuse.    The following portions of the patient's history were reviewed and updated as appropriate: allergies, current medications, past family  "history, past medical history, past social history, past surgical history, and problem list.    Objective:  There were no vitals filed for this visit.      Weight (last 2 days)       None            Mental status:  Appearance sitting comfortably in chair, dressed in casual clothing, cooperative with interview   Mood \" More irritable\"   Affect Appears generally euthymic, stable, mood-congruent   Speech Normal rate, rhythm, and volume   Thought Processes [place]   Associations intact associations   Hallucinations Denies any auditory or visual hallucinations   Thought Content Fleeting passive suicidal ideation   Orientation Oriented to person, place, time, and situation   Recent and Remote Memory Grossly intact   Attention Span and Concentration Concentration intact   Intellect Appears to be of Average Intelligence   Insight Limited insight   Judgement judgment was limited   Muscle Strength Muscle strength and tone were normal   Language Within normal limits   Fund of Knowledge Age appropriate   Pain None     PHQ-A Depression Screening               Assessment/Plan:     Impression:  Major depressive disorder  Generalized anxiety disorder  Mood Disorder  R/O  Bipolar Disorder     Increase venlafaxine 75 mg daily for anxiety and depression   Continue Seroquel 200 mg daily for mood and sleep   Currently on the wait list for outpatient therapy at [facility]  Medical follow up with PCP as needed  Aware of 24 hour and weekend coverage for urgent situations accessed by calling [facility] main practice number  Follow up in 4 weeks     Diagnoses:   Diagnoses and all orders for this visit:    Generalized anxiety disorder  -     venlafaxine (EFFEXOR-XR) 75 mg 24 hr capsule; Take 1 capsule (75 mg total) by mouth daily    Current severe episode of major depressive disorder without psychotic features without prior episode (HCC)  -     venlafaxine (EFFEXOR-XR) 75 mg 24 hr capsule; Take 1 " capsule (75 mg total) by mouth daily    Other orders  -     fluconazole (DIFLUCAN) 150 mg tablet          Treatment Recommendations:      Risks, Benefits And Possible Side Effects Of Medications:  Risks, benefits, and possible side effects of medications explained to patient and family, they verbalize understanding and Reviewed risks/benefits and side effects of antidepressant medications including black box warning on antidepressants, patient and family verbalize understanding.    Controlled Medication Discussion: No records found for controlled prescriptions according to [place] Prescription Drug Monitoring Program.      Treatment Plan:Next treatment plan due 11/22/2024.     Visit Time    Visit Start Time: 10:30 AM  Visit Stop Time: 10:44 AM  Total Visit Duration:  14 minutes      ANSHU Renee  08/29/24

## 2024-09-09 DIAGNOSIS — F41.1 GENERALIZED ANXIETY DISORDER: ICD-10-CM

## 2024-09-09 DIAGNOSIS — F32.2 CURRENT SEVERE EPISODE OF MAJOR DEPRESSIVE DISORDER WITHOUT PSYCHOTIC FEATURES WITHOUT PRIOR EPISODE (HCC): ICD-10-CM

## 2024-09-09 RX ORDER — QUETIAPINE FUMARATE 100 MG/1
TABLET, FILM COATED ORAL
Qty: 60 TABLET | Refills: 0 | Status: SHIPPED | OUTPATIENT
Start: 2024-09-09

## 2024-09-23 ENCOUNTER — TELEPHONE (OUTPATIENT)
Dept: PSYCHIATRY | Facility: CLINIC | Age: 19
End: 2024-09-23

## 2024-09-25 ENCOUNTER — PATIENT MESSAGE (OUTPATIENT)
Dept: DERMATOLOGY | Facility: CLINIC | Age: 19
End: 2024-09-25

## 2024-10-02 DIAGNOSIS — F41.1 GENERALIZED ANXIETY DISORDER: ICD-10-CM

## 2024-10-02 DIAGNOSIS — F32.2 CURRENT SEVERE EPISODE OF MAJOR DEPRESSIVE DISORDER WITHOUT PSYCHOTIC FEATURES WITHOUT PRIOR EPISODE (HCC): ICD-10-CM

## 2024-10-03 RX ORDER — VENLAFAXINE HYDROCHLORIDE 75 MG/1
75 CAPSULE, EXTENDED RELEASE ORAL DAILY
Qty: 30 CAPSULE | Refills: 0 | Status: SHIPPED | OUTPATIENT
Start: 2024-10-03

## 2024-10-03 RX ORDER — QUETIAPINE FUMARATE 100 MG/1
TABLET, FILM COATED ORAL
Qty: 60 TABLET | Refills: 0 | Status: SHIPPED | OUTPATIENT
Start: 2024-10-03

## 2024-11-05 ENCOUNTER — ANNUAL EXAM (OUTPATIENT)
Age: 19
End: 2024-11-05
Payer: COMMERCIAL

## 2024-11-05 VITALS
HEIGHT: 61 IN | BODY MASS INDEX: 17.9 KG/M2 | DIASTOLIC BLOOD PRESSURE: 70 MMHG | SYSTOLIC BLOOD PRESSURE: 90 MMHG | WEIGHT: 94.8 LBS

## 2024-11-05 DIAGNOSIS — N76.0 BV (BACTERIAL VAGINOSIS): ICD-10-CM

## 2024-11-05 DIAGNOSIS — Z09 FOLLOW-UP EXAM: Primary | ICD-10-CM

## 2024-11-05 DIAGNOSIS — L70.8 OTHER ACNE: ICD-10-CM

## 2024-11-05 DIAGNOSIS — F41.1 GENERALIZED ANXIETY DISORDER: ICD-10-CM

## 2024-11-05 DIAGNOSIS — F32.2 CURRENT SEVERE EPISODE OF MAJOR DEPRESSIVE DISORDER WITHOUT PSYCHOTIC FEATURES WITHOUT PRIOR EPISODE (HCC): ICD-10-CM

## 2024-11-05 DIAGNOSIS — B96.89 BV (BACTERIAL VAGINOSIS): ICD-10-CM

## 2024-11-05 LAB
BV WHIFF TEST VAG QL: NORMAL
CLUE CELLS SPEC QL WET PREP: NORMAL
PH SMN: NORMAL [PH]
SL AMB POCT WET MOUNT: NORMAL
T VAGINALIS VAG QL WET PREP: NORMAL
YEAST VAG QL WET PREP: NORMAL

## 2024-11-05 PROCEDURE — 87210 SMEAR WET MOUNT SALINE/INK: CPT | Performed by: OBSTETRICS & GYNECOLOGY

## 2024-11-05 PROCEDURE — 99213 OFFICE O/P EST LOW 20 MIN: CPT | Performed by: OBSTETRICS & GYNECOLOGY

## 2024-11-05 RX ORDER — QUETIAPINE FUMARATE 100 MG/1
TABLET, FILM COATED ORAL
Qty: 60 TABLET | Refills: 0 | Status: SHIPPED | OUTPATIENT
Start: 2024-11-05

## 2024-11-05 RX ORDER — METRONIDAZOLE 7.5 MG/G
1 GEL VAGINAL
Qty: 5 G | Refills: 3 | Status: SHIPPED | OUTPATIENT
Start: 2024-11-05 | End: 2024-11-25

## 2024-11-05 RX ORDER — ADAPALENE 45 G/G
GEL TOPICAL
Qty: 45 G | Refills: 6 | Status: SHIPPED | OUTPATIENT
Start: 2024-11-05 | End: 2025-11-05

## 2024-11-05 RX ORDER — VENLAFAXINE HYDROCHLORIDE 75 MG/1
75 CAPSULE, EXTENDED RELEASE ORAL DAILY
Qty: 30 CAPSULE | Refills: 0 | Status: SHIPPED | OUTPATIENT
Start: 2024-11-05

## 2024-11-05 NOTE — PROGRESS NOTES
"What we talked about today:    There are no Patient Instructions on file for this visit.        Assessment/Plan:    {There are no diagnoses linked to this encounter. (Refresh or delete this SmartLink)}        Subjective:      Patient ID: Nato Matta is a 19 y.o. female, who presents for an annual exam today.     HPI:    Pt states here for annual exam.  Sexually active with female partner.        GYN History:    Patient's last menstrual period was 10/24/2024.  Period Cycle (Days): 30  Period Duration (Days): 5  Period Pattern: Regular  Menstrual Flow: Moderate  Dysmenorrhea: (!) Mild  Dysmenorrhea Symptoms: Cramping  OB History          1    Para   0    Term   0       0    AB   1    Living   0         SAB   0    IAB   0    Ectopic   0    Multiple   0    Live Births   0                  Last pap smear: never.     Smoking/alcohol/drug use. {YES-DESCRIBE/NO:71892}    Exercise:  {YES-DESCRIBE/NO:01635}    Sun exposure: ***, {yes:52299} sunscreen use.    Seat belt use:  {yes:87742}, appropriate counseling reviewed.      Last saw Family Physician:  ***    Up to date with vaccines:  {yes:14181}, including ***.    Last mammogram: ***, BiRads***, TC=***    Last colonoscopy: ***    The following portions of the patient's history were reviewed and updated as appropriate: allergies, current medications, past family history, past medical history, past social history, past surgical history, and problem list.      Family history:      Family history   {IS IS NOT:22398}  positive for breast, uterine, ovarian cancer, colon cancer, or melanoma skin cancer.    Other family history of cancers:  {YES-DESCRIBE/NO:73955}        Review of Systems          Objective:      BP 90/70 (BP Location: Left arm, Patient Position: Sitting, Cuff Size: Standard)   Ht 5' 1.25\" (1.556 m)   Wt 43 kg (94 lb 12.8 oz)   LMP 10/24/2024   BMI 17.77 kg/m²        Physical Exam      Cardiac:  murmur appreciated {YES-DESCRIBE/NO:06706}    Breast " "exam: {Desc; normal/abnormal/exam deferred:76969::\"normal\"}    GYN exam: ***    Wetprep {Was/was not:64019} performed today.      {pe wet prep/koh:312782}              R Cynthia Proctor MD, FACOG  "

## 2024-11-05 NOTE — PATIENT INSTRUCTIONS
Pap due February 2025.   Continue with Femdophilus probiotics.   For Metrogel if BV symptoms return.   Patient verbalized understanding of today's discussion with all questions and concerns addressed to her satisfaction.

## 2024-11-05 NOTE — PROGRESS NOTES
"What we talked about today:    Patient Instructions   Pap due February 2025.   Continue with Femdophilus probiotics.   For Metrogel if BV symptoms return.   Patient verbalized understanding of today's discussion with all questions and concerns addressed to her satisfaction.            Assessment/Plan:    1. Follow-up exam  2. BV (bacterial vaginosis)  -     metroNIDAZOLE (METROGEL) 0.75 % vaginal gel; Insert 1 Application into the vagina daily at bedtime for 20 days  -     POCT wet mount  3. Other acne  -     adapalene (DIFFERIN) 0.1 % gel; Apply topically daily at bedtime          Subjective:      Patient ID: Nato Matta is a 19 y.o. female, presents today complaining of follow-up UTI. Patient reports last UTI was two weeks following August 8 appointment. She took the levofloxacin as prescribed which cleared up the infection.     HPI:    Pt states no more UTIs since then.  Has been doing great on Femdophilus!    No issues w/ sex.  Libido is good.       The following portions of the patient's history were reviewed and updated as appropriate: allergies, current medications, past family history, past medical history, past social history, past surgical history, and problem list.      Review of Systems   Constitutional: Negative.    HENT: Negative.     Respiratory: Negative.     Cardiovascular: Negative.    Gastrointestinal: Negative.    Endocrine: Negative.    Genitourinary: Negative.  Negative for dysuria and vaginal discharge.   Musculoskeletal: Negative.    Skin: Negative.    Allergic/Immunologic: Negative.    Neurological: Negative.    Hematological: Negative.    Psychiatric/Behavioral: Negative.     All other systems reviewed and are negative.            Objective:      BP 90/70 (BP Location: Left arm, Patient Position: Sitting, Cuff Size: Standard)   Ht 5' 1.25\" (1.556 m)   Wt 43 kg (94 lb 12.8 oz)   LMP 10/24/2024   BMI 17.77 kg/m²        Physical Exam  Vitals reviewed.   Constitutional:       General: She " is not in acute distress.     Appearance: Normal appearance. She is not ill-appearing.   HENT:      Head: Normocephalic and atraumatic.   Eyes:      Extraocular Movements: Extraocular movements intact.   Musculoskeletal:      Cervical back: Normal range of motion.   Skin:     General: Skin is warm and dry.   Neurological:      Mental Status: She is alert.   Psychiatric:         Mood and Affect: Mood normal.         Behavior: Behavior normal.         Thought Content: Thought content normal.         Judgment: Judgment normal.           GYN exam: NEFG. No CMT, uterine, or adnexal tenderness to palpation.   Normal vaginal d/c appearance.     Wetprep was performed today.    [place] clue cells, good bacteria and some white cells noted.         BRISA Proctor MD, FACOG

## 2024-11-06 ENCOUNTER — TELEPHONE (OUTPATIENT)
Dept: PSYCHIATRY | Facility: CLINIC | Age: 19
End: 2024-11-06

## 2024-11-06 NOTE — TELEPHONE ENCOUNTER
Writer called client to confirm plans to switch from [facility] due to coverage being out of network starting January. Client informed writer that she was aware of the change and was going to change MA; however, voiced displeasure at the time of the wait list.     Client appears to have been on intake waitlist; however, writer found no letter of discharge from previous therapist, and did not spot a discharge summary upon brief overview of encounters.    Writer transferred client to the LORI coordinator, as LORI's do have higher priority than new clients and from what writer can tell, she is a LORI and not returning as a new client from a lengthy absence.    Writer also forwarded note to LORI coordinator in case client got voicemail that client would like a call back.

## 2024-11-07 ENCOUNTER — OFFICE VISIT (OUTPATIENT)
Dept: BEHAVIORAL/MENTAL HEALTH CLINIC | Facility: CLINIC | Age: 19
End: 2024-11-07

## 2024-11-07 DIAGNOSIS — F41.1 GENERALIZED ANXIETY DISORDER: Primary | ICD-10-CM

## 2024-11-07 NOTE — BH TREATMENT PLAN
Outpatient Behavioral Health Psychotherapy Treatment Plan    Nato Matta  2005     Date of Initial Psychotherapy Assessment: 11/7/2024  Date of Current Treatment Plan: 11/07/24  Treatment Plan Target Date: TBD  Treatment Plan Expiration Date: 4/7/2025    Diagnosis:   1. Generalized anxiety disorder            Area(s) of Need: Working through my anxiety, my anger    Long Term Goal 1 (in the client's own words): To feel less angry and to have a more positive outlook.     Stage of Change: Contemplation    Target Date for completion: 4/7/2025     Anticipated therapeutic modalities: CBT     People identified to complete this goal: Myself, Therapist      Objective 1: (identify the means of measuring success in meeting the objective): showing up to therapy, holding myself accountable.       Objective 2: (identify the means of measuring success in meeting the objective): exploring different coping skills for when I am feeling angry.         I am currently under the care of a [facility] psychiatric provider: yes    My [facility] psychiatric provider is: Marie Garcia    I am currently taking psychiatric medications: Yes, as prescribed    I feel that I will be ready for discharge from mental health care when I reach the following (measurable goal/objective): when I feel like I can make it through the weeks using my coping skills and feeling less angry.     For children and adults who have a legal guardian:   Has there been any change to custody orders and/or guardianship status? NA. If yes, attach updated documentation.    I have reviewed my Crisis Plan and have been offered a copy of this plan    Behavioral Health Treatment Plan [facility]: Diagnosis and Treatment Plan explained to Nato Matat acknowledges an understanding of their diagnosis. Nato Matta agrees to this treatment plan.    I have been offered a copy of this Treatment Plan. yes
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
## 2024-11-07 NOTE — PSYCH
Behavioral Health Psychotherapy Assessment    Date of Initial Psychotherapy Assessment: 11/07/24  Referral Source: ***  Has a release of information been signed for the referral source? {Yes/No/NA:73699}    Preferred Name: Nato Matta  Preferred Pronouns: {Preferred Pronouns:22214}  YOB: 2005 Age: 19 y.o.  Sex assigned at birth: {SL Sex Assigned at Birth:4084709713}   Gender Identity: ***  Race: {Race/ethnicity:85198}  Preferred Language: {Misc; languages:58081}    Emergency Contact:  Full Name: ***  Relationship to Client: ***  Contact information: ***    Primary Care Physician:  Ciara Ram DO  [address] [address] [phone]  Has a release of information been signed? {Yes/No/NA:72383}    Physical Health History:  Past surgical procedures: ***  Do you have a history of any of the following: {SL Amb Psych Physical Health:71140}  Do you have any mobility issues? {YES-DESCRIBE/NO:20455}    Relevant Family History:  ***    Presenting Problem (What brings you in?)  ***    Mental Health Advance Directive:  Do you currently have a Mental Health Advance Directive?{YES/NO:20200}    Diagnosis:  No diagnosis found.    Psych Initial Assessment    Visit start and stop times:    11/07/24

## 2024-11-07 NOTE — PSYCH
Behavioral Health Psychotherapy Assessment    Date of Initial Psychotherapy Assessment: 11/07/24  Referral Source: ***  Has a release of information been signed for the referral source? {Yes/No/NA:48848}    Preferred Name: Nato Matta  Preferred Pronouns: {Preferred Pronouns:87745}  YOB: 2005 Age: 19 y.o.  Sex assigned at birth: {SL Sex Assigned at Birth:0234294581}   Gender Identity: ***  Race: {Race/ethnicity:64463}  Preferred Language: {Misc; languages:96675}    Emergency Contact:  Full Name: ***  Relationship to Client: ***  Contact information: ***    Primary Care Physician:  Ciara Ram DO  [address] [address] [phone]  Has a release of information been signed? {Yes/No/NA:50369}    Physical Health History:  Past surgical procedures: ***  Do you have a history of any of the following: {SL Amb Psych Physical Health:69056}  Do you have any mobility issues? {YES-DESCRIBE/NO:31657}    Relevant Family History:  ***    Presenting Problem (What brings you in?)  ***    Mental Health Advance Directive:  Do you currently have a Mental Health Advance Directive?{YES/NO:20200}    Diagnosis:  No diagnosis found.    Psych Initial Assessment    Visit start and stop times:    11/07/24

## 2024-11-14 ENCOUNTER — SOCIAL WORK (OUTPATIENT)
Dept: BEHAVIORAL/MENTAL HEALTH CLINIC | Facility: CLINIC | Age: 19
End: 2024-11-14

## 2024-11-14 DIAGNOSIS — F41.1 GENERALIZED ANXIETY DISORDER: Primary | ICD-10-CM

## 2024-11-14 NOTE — PSYCH
"Behavioral Health Psychotherapy Progress Note    Psychotherapy Provided: Individual Psychotherapy     1. Generalized anxiety disorder            Goals addressed in session: Goal 1     DATA: Client and therapist began with a check in on how she was doing and how her week went. Client shared her week going well, having the day off today completely and was going to \"go thrifting\" as client reported really enjoying that. Client and therapist discussed more at length about her family, shared about relationship with mother, brother, lack of with father and discussed that she was concerned about her niece. Client shared about her past and what happened with her father leaving.   During this session, this clinician used the following therapeutic modalities: Client-centered Therapy and Cognitive Behavioral Therapy    Substance Abuse was not addressed during this session. If the client is diagnosed with a co-occurring substance use disorder, please indicate any changes in the frequency or amount of use:  Stage of change for addressing substance use diagnoses: No substance use/Not applicable    ASSESSMENT:  Nato Matta presents with a Euthymic/ normal mood.     her affect is Normal range and intensity, which is congruent, with her mood and the content of the session. The client has made progress on their goals.     Nato Matta presents with a none risk of suicide, none risk of self-harm, and none risk of harm to others.    For any risk assessment that surpasses a \"low\" rating, a safety plan must be developed.    A safety plan was indicated: no  If yes, describe in detail crisis plan on file    PLAN: Between sessions, Nato Matta will continue exploring what triggers her anger. At the next session, the therapist will use Client-centered Therapy and Cognitive Behavioral Therapy to address anger within relationships.    Behavioral Health Treatment Plan and Discharge Planning: Nato Matta is aware of and agrees to continue to work " on their treatment plan. They have identified and are working toward their discharge goals. yes    Visit start and stop times:    11/14/24  Start Time: 1000  Stop Time: 1052  Total Visit Time: 52 minutes

## 2024-11-21 ENCOUNTER — SOCIAL WORK (OUTPATIENT)
Dept: BEHAVIORAL/MENTAL HEALTH CLINIC | Facility: CLINIC | Age: 19
End: 2024-11-21

## 2024-11-21 DIAGNOSIS — F41.1 GENERALIZED ANXIETY DISORDER: Primary | ICD-10-CM

## 2024-11-21 NOTE — PSYCH
"Behavioral Health Psychotherapy Progress Note    Psychotherapy Provided: Individual Psychotherapy     1. Generalized anxiety disorder            Goals addressed in session: Goal 1     DATA: Client and therapist met in office for the session. Client and therapist began with checking in. Client shared feeling well overall however reported that she was not invited yet for Thanksgiving and was concerned as it was next week- by her fathers side of the family. Therapist practice reflective listening and active listening as client shared about past with father and fathers side of the family. Client shared that she was always invited at least by her aunt. Therapist and client agreed for client to try reaching out to aunt and seeing about the holidays. Client and therapist discussed shared even though not having a good relationship with her father, that she still wanted it with his family. Client shared wanting a better relationship with mom, shared she is always home but that they dont do anything together, discussed reaching out to mom to go do something- like get dinner. Client agreed and discussed text options/in person options.   During this session, this clinician used the following therapeutic modalities: Client-centered Therapy    Substance Abuse was not addressed during this session. If the client is diagnosed with a co-occurring substance use disorder, please indicate any changes in the frequency or amount of use:  Stage of change for addressing substance use diagnoses: No substance use/Not applicable    ASSESSMENT:  Nato Matta presents with a Euthymic/ normal mood.     her affect is Normal range and intensity, which is congruent, with her mood and the content of the session. The client has made progress on their goals.     Nato Matta presents with a none risk of suicide, none risk of self-harm, and none risk of harm to others.    For any risk assessment that surpasses a \"low\" rating, a safety plan must be " developed.    A safety plan was indicated: no  If yes, describe in detail crisis plan on file    PLAN: Between sessions, Nato Matta will reach out to aunt via text to see about holidays, will ask mom to get dinner. At the next session, the therapist will use Client-centered Therapy to address anxiety.    Behavioral Health Treatment Plan and Discharge Planning: Nato Matta is aware of and agrees to continue to work on their treatment plan. They have identified and are working toward their discharge goals. yes    Visit start and stop times:    11/21/24  Start Time: 1001  Stop Time: 1055  Total Visit Time: 54 minutes

## 2024-11-26 ENCOUNTER — SOCIAL WORK (OUTPATIENT)
Dept: BEHAVIORAL/MENTAL HEALTH CLINIC | Facility: CLINIC | Age: 19
End: 2024-11-26
Payer: COMMERCIAL

## 2024-11-26 DIAGNOSIS — F41.1 GENERALIZED ANXIETY DISORDER: Primary | ICD-10-CM

## 2024-11-26 PROCEDURE — 90837 PSYTX W PT 60 MINUTES: CPT

## 2024-11-26 NOTE — PSYCH
"Behavioral Health Psychotherapy Progress Note    Psychotherapy Provided: Individual Psychotherapy     1. Generalized anxiety disorder            Goals addressed in session: Goal 1     DATA: client and therapist met in office for session. Client and therapist discussed that client was doing well. Client shared about previous week, about work and relaxation when not. Therapist provided reflective listening and empathy as client shared she learned that her grandfather was in the hospital and that no one told her and that she learned from her great-aunt that she was invited to the family reunion but was upset that she was not directly invited by her grandparents. Client and therapist worked on processing thoughts of does client want to keep this relationship, does client want to try and reach out? Etc. Client and therapist discussed plans for the holidays and that she wanted to try asking her mom again to hang out.   During this session, this clinician used the following therapeutic modalities: Client-centered Therapy and Cognitive Behavioral Therapy    Substance Abuse was not addressed during this session. If the client is diagnosed with a co-occurring substance use disorder, please indicate any changes in the frequency or amount of use:  Stage of change for addressing substance use diagnoses: No substance use/Not applicable    ASSESSMENT:  Nato Matta presents with a Euthymic/ normal mood.     her affect is Normal range and intensity, which is congruent, with her mood and the content of the session. The client has made progress on their goals.     Nato Matta presents with a none risk of suicide, none risk of self-harm, and none risk of harm to others.    For any risk assessment that surpasses a \"low\" rating, a safety plan must be developed.    A safety plan was indicated: no  If yes, describe in detail on file    PLAN: Between sessions, Nato Matta will try asking mom to hang out, make list to determine if she wants " relationship with dad's extended family. At the next session, the therapist will use Client-centered Therapy and Cognitive Behavioral Therapy to address anxiety, feeling un-welcomed .    Behavioral Health Treatment Plan and Discharge Planning: Nato Matta is aware of and agrees to continue to work on their treatment plan. They have identified and are working toward their discharge goals. yes    Visit start and stop times:    11/26/24  Start Time: 1000  Stop Time: 1058  Total Visit Time: 58 minutes

## 2024-11-26 NOTE — TELEPHONE ENCOUNTER
Kai Coffey called and would like an appt to have her throat checked  Believes she may strep    [phone] 26-Nov-2024 17:40

## 2024-12-04 ENCOUNTER — TELEPHONE (OUTPATIENT)
Age: 19
End: 2024-12-04

## 2024-12-04 ENCOUNTER — TELEPHONE (OUTPATIENT)
Dept: FAMILY MEDICINE CLINIC | Facility: CLINIC | Age: 19
End: 2024-12-04

## 2024-12-04 NOTE — TELEPHONE ENCOUNTER
Patient needs an appt before letter can be written. Please schedule her for a physical with  thank you

## 2024-12-05 DIAGNOSIS — F41.1 GENERALIZED ANXIETY DISORDER: ICD-10-CM

## 2024-12-05 DIAGNOSIS — F32.2 CURRENT SEVERE EPISODE OF MAJOR DEPRESSIVE DISORDER WITHOUT PSYCHOTIC FEATURES WITHOUT PRIOR EPISODE (HCC): ICD-10-CM

## 2024-12-05 RX ORDER — VENLAFAXINE HYDROCHLORIDE 75 MG/1
75 CAPSULE, EXTENDED RELEASE ORAL DAILY
Qty: 30 CAPSULE | Refills: 0 | OUTPATIENT
Start: 2024-12-05

## 2024-12-05 RX ORDER — QUETIAPINE FUMARATE 100 MG/1
TABLET, FILM COATED ORAL
Qty: 60 TABLET | Refills: 0 | OUTPATIENT
Start: 2024-12-05

## 2024-12-05 NOTE — TELEPHONE ENCOUNTER
Looks like this person has not followed up for over 3 months and had been recently increased on their dose of psychotropics to assess for effect.  They also appear to have some endocrine issues.  I do not know their case nor do we have a follow-up.  I do not feel comfortable refilling her medications given this little information and lack of follow-up

## 2024-12-06 ENCOUNTER — TELEPHONE (OUTPATIENT)
Age: 19
End: 2024-12-06

## 2024-12-06 ENCOUNTER — APPOINTMENT (EMERGENCY)
Dept: CT IMAGING | Facility: HOSPITAL | Age: 19
End: 2024-12-06
Payer: COMMERCIAL

## 2024-12-06 ENCOUNTER — HOSPITAL ENCOUNTER (EMERGENCY)
Facility: HOSPITAL | Age: 19
Discharge: HOME | End: 2024-12-06
Attending: EMERGENCY MEDICINE
Payer: COMMERCIAL

## 2024-12-06 VITALS
HEART RATE: 85 BPM | SYSTOLIC BLOOD PRESSURE: 120 MMHG | RESPIRATION RATE: 16 BRPM | TEMPERATURE: 97.6 F | DIASTOLIC BLOOD PRESSURE: 72 MMHG | OXYGEN SATURATION: 98 %

## 2024-12-06 DIAGNOSIS — N28.1 RENAL CYST, LEFT: ICD-10-CM

## 2024-12-06 DIAGNOSIS — R10.9 ABDOMINAL PAIN: Primary | ICD-10-CM

## 2024-12-06 DIAGNOSIS — K59.00 CONSTIPATION: ICD-10-CM

## 2024-12-06 DIAGNOSIS — F32.2 CURRENT SEVERE EPISODE OF MAJOR DEPRESSIVE DISORDER WITHOUT PSYCHOTIC FEATURES WITHOUT PRIOR EPISODE (HCC): ICD-10-CM

## 2024-12-06 DIAGNOSIS — F41.1 GENERALIZED ANXIETY DISORDER: ICD-10-CM

## 2024-12-06 LAB
ALBUMIN SERPL BCG-MCNC: 4.7 G/DL (ref 3.5–5)
ALP SERPL-CCNC: 57 U/L (ref 34–104)
ALT SERPL W P-5'-P-CCNC: 10 U/L (ref 7–52)
ANION GAP SERPL CALCULATED.3IONS-SCNC: 5 MMOL/L (ref 4–13)
AST SERPL W P-5'-P-CCNC: 15 U/L (ref 13–39)
BACTERIA UR QL AUTO: ABNORMAL /HPF
BASOPHILS # BLD AUTO: 0.02 THOUSANDS/ÂΜL (ref 0–0.1)
BASOPHILS NFR BLD AUTO: 0 % (ref 0–1)
BILIRUB SERPL-MCNC: 0.54 MG/DL (ref 0.2–1)
BILIRUB UR QL STRIP: NEGATIVE
BUN SERPL-MCNC: 13 MG/DL (ref 5–25)
CALCIUM SERPL-MCNC: 8.8 MG/DL (ref 8.4–10.2)
CHLORIDE SERPL-SCNC: 103 MMOL/L (ref 96–108)
CLARITY UR: CLEAR
CO2 SERPL-SCNC: 28 MMOL/L (ref 21–32)
COLOR UR: YELLOW
CREAT SERPL-MCNC: 0.6 MG/DL (ref 0.6–1.3)
EOSINOPHIL # BLD AUTO: 0.04 THOUSAND/ÂΜL (ref 0–0.61)
EOSINOPHIL NFR BLD AUTO: 1 % (ref 0–6)
ERYTHROCYTE [DISTWIDTH] IN BLOOD BY AUTOMATED COUNT: 12.9 % (ref 11.6–15.1)
EXT PREGNANCY TEST URINE: NEGATIVE
EXT. CONTROL: NORMAL
GFR SERPL CREATININE-BSD FRML MDRD: 131 ML/MIN/1.73SQ M
GLUCOSE SERPL-MCNC: 103 MG/DL (ref 65–140)
GLUCOSE UR STRIP-MCNC: NEGATIVE MG/DL
HCT VFR BLD AUTO: 39.8 % (ref 34.8–46.1)
HGB BLD-MCNC: 13.3 G/DL (ref 11.5–15.4)
HGB UR QL STRIP.AUTO: NEGATIVE
IMM GRANULOCYTES # BLD AUTO: 0.04 THOUSAND/UL (ref 0–0.2)
IMM GRANULOCYTES NFR BLD AUTO: 1 % (ref 0–2)
KETONES UR STRIP-MCNC: NEGATIVE MG/DL
LEUKOCYTE ESTERASE UR QL STRIP: NEGATIVE
LIPASE SERPL-CCNC: 34 U/L (ref 11–82)
LYMPHOCYTES # BLD AUTO: 0.86 THOUSANDS/ÂΜL (ref 0.6–4.47)
LYMPHOCYTES NFR BLD AUTO: 11 % (ref 14–44)
MCH RBC QN AUTO: 29.4 PG (ref 26.8–34.3)
MCHC RBC AUTO-ENTMCNC: 33.4 G/DL (ref 31.4–37.4)
MCV RBC AUTO: 88 FL (ref 82–98)
MONOCYTES # BLD AUTO: 0.25 THOUSAND/ÂΜL (ref 0.17–1.22)
MONOCYTES NFR BLD AUTO: 3 % (ref 4–12)
NEUTROPHILS # BLD AUTO: 6.53 THOUSANDS/ÂΜL (ref 1.85–7.62)
NEUTS SEG NFR BLD AUTO: 84 % (ref 43–75)
NITRITE UR QL STRIP: NEGATIVE
NON-SQ EPI CELLS URNS QL MICRO: ABNORMAL /HPF
NRBC BLD AUTO-RTO: 0 /100 WBCS
PH UR STRIP.AUTO: 6.5 [PH]
PLATELET # BLD AUTO: 236 THOUSANDS/UL (ref 149–390)
PMV BLD AUTO: 10.3 FL (ref 8.9–12.7)
POTASSIUM SERPL-SCNC: 4 MMOL/L (ref 3.5–5.3)
PROT SERPL-MCNC: 6.9 G/DL (ref 6.4–8.4)
PROT UR STRIP-MCNC: ABNORMAL MG/DL
RBC # BLD AUTO: 4.52 MILLION/UL (ref 3.81–5.12)
RBC #/AREA URNS AUTO: ABNORMAL /HPF
SODIUM SERPL-SCNC: 136 MMOL/L (ref 135–147)
SP GR UR STRIP.AUTO: 1.02 (ref 1–1.03)
TSH SERPL DL<=0.05 MIU/L-ACNC: 0.5 UIU/ML (ref 0.45–4.5)
UROBILINOGEN UR STRIP-ACNC: <2 MG/DL
WBC # BLD AUTO: 7.74 THOUSAND/UL (ref 4.31–10.16)
WBC #/AREA URNS AUTO: ABNORMAL /HPF

## 2024-12-06 PROCEDURE — 74177 CT ABD & PELVIS W/CONTRAST: CPT

## 2024-12-06 PROCEDURE — 80053 COMPREHEN METABOLIC PANEL: CPT | Performed by: EMERGENCY MEDICINE

## 2024-12-06 PROCEDURE — 84443 ASSAY THYROID STIM HORMONE: CPT | Performed by: EMERGENCY MEDICINE

## 2024-12-06 PROCEDURE — 81025 URINE PREGNANCY TEST: CPT | Performed by: EMERGENCY MEDICINE

## 2024-12-06 PROCEDURE — 83690 ASSAY OF LIPASE: CPT | Performed by: EMERGENCY MEDICINE

## 2024-12-06 PROCEDURE — 36415 COLL VENOUS BLD VENIPUNCTURE: CPT | Performed by: EMERGENCY MEDICINE

## 2024-12-06 PROCEDURE — 81001 URINALYSIS AUTO W/SCOPE: CPT | Performed by: EMERGENCY MEDICINE

## 2024-12-06 PROCEDURE — 85025 COMPLETE CBC W/AUTO DIFF WBC: CPT | Performed by: EMERGENCY MEDICINE

## 2024-12-06 PROCEDURE — 96360 HYDRATION IV INFUSION INIT: CPT

## 2024-12-06 PROCEDURE — 99284 EMERGENCY DEPT VISIT MOD MDM: CPT

## 2024-12-06 PROCEDURE — 99284 EMERGENCY DEPT VISIT MOD MDM: CPT | Performed by: EMERGENCY MEDICINE

## 2024-12-06 RX ORDER — VENLAFAXINE HYDROCHLORIDE 75 MG/1
75 CAPSULE, EXTENDED RELEASE ORAL DAILY
Qty: 30 CAPSULE | Refills: 0 | Status: SHIPPED | OUTPATIENT
Start: 2024-12-06

## 2024-12-06 RX ORDER — QUETIAPINE FUMARATE 100 MG/1
TABLET, FILM COATED ORAL
Qty: 60 TABLET | Refills: 0 | Status: SHIPPED | OUTPATIENT
Start: 2024-12-06

## 2024-12-06 RX ORDER — POLYETHYLENE GLYCOL 3350 17 G/17G
17 POWDER, FOR SOLUTION ORAL ONCE
Status: COMPLETED | OUTPATIENT
Start: 2024-12-06 | End: 2024-12-06

## 2024-12-06 RX ORDER — SODIUM PHOSPHATE,MONO-DIBASIC 19G-7G/118
1 ENEMA (ML) RECTAL ONCE
Status: COMPLETED | OUTPATIENT
Start: 2024-12-06 | End: 2024-12-06

## 2024-12-06 RX ADMIN — POLYETHYLENE GLYCOL 3350 17 G: 17 POWDER, FOR SOLUTION ORAL at 14:58

## 2024-12-06 RX ADMIN — IOHEXOL 90 ML: 350 INJECTION, SOLUTION INTRAVENOUS at 13:08

## 2024-12-06 RX ADMIN — SODIUM PHOSPHATE, DIBASIC AND SODIUM PHOSPHATE, MONOBASIC 1 ENEMA: 7; 19 ENEMA RECTAL at 14:58

## 2024-12-06 RX ADMIN — SODIUM CHLORIDE 1000 ML: 0.9 INJECTION, SOLUTION INTRAVENOUS at 11:37

## 2024-12-06 NOTE — TELEPHONE ENCOUNTER
Patients mother called in requesting a letter for the Welfare office for health insurance purposes.    Mother shared letter needs to state that she's a patient here receiving services for TT and med management.   Letter also needs to state the medication that she is being prescribed from med management provider     Writer informed patients mother an SABINA must be signed by patient.     Patients mother relayed patient will be picking up the letter & sign SABINA in person on Monday 12/9 @10:30am.     Writer also informed patients mother that letter might not be done by Monday morning due to the short noticed.     Writer informed patients mother someone will be in touch with them once this is completed.

## 2024-12-06 NOTE — ED PROVIDER NOTES
"Time reflects when diagnosis was documented in both MDM as applicable and the Disposition within this note       Time User Action Codes Description Comment    12/6/2024  2:49 PM Sixto Hills [R10.9] Abdominal pain     12/6/2024  2:49 PM Sixto Hills [K59.00] Constipation     12/6/2024  2:50 PM Sixto Hills [N28.1] Renal cyst, left           ED Disposition       ED Disposition   Discharge    Condition   Stable    Date/Time   Fri Dec 6, 2024  2:49 PM    Comment   Nato Matta discharge to home/self care.                   Assessment & Plan       Medical Decision Making  Abdominal pain differential at this time includes diverticulitis kidney stone constipation ovarian cyst workup in progress lab work urinalysis and CAT scan    Amount and/or Complexity of Data Reviewed  Labs: ordered.  Radiology: ordered.    Risk  OTC drugs.  Prescription drug management.             Medications   sodium chloride 0.9 % bolus 1,000 mL (0 mL Intravenous Stopped 12/6/24 1237)   iohexol (OMNIPAQUE) 350 MG/ML injection (MULTI-DOSE) 100 mL (90 mL Intravenous Given 12/6/24 1308)   polyethylene glycol (MIRALAX) packet 17 g (17 g Oral Given 12/6/24 1458)   sodium phosphate-biphosphate (FLEET) enema 1 enema (1 enema Rectal Given 12/6/24 1458)       ED Risk Strat Scores             CRAFFT      Flowsheet Row Most Recent Value   CRAFFT Initial Screen: During the past 12 months, did you:    1. Drink any alcohol (more than a few sips)?  No Filed at: 12/06/2024 1233   2. Smoke any marijuana or hashish No Filed at: 12/06/2024 1233   3. Use anything else to get high? (\"anything else\" includes illegal drugs, over the counter and prescription drugs, and things that you sniff or 'shaw')? No Filed at: 12/06/2024 1233                                          History of Present Illness       Chief Complaint   Patient presents with    Abdominal Pain     Pt states \"I have really bad stomach pains and it comes and goes and I havent been able to poop " "for couple days (four days)and my pain is mainly on my left side a little lower\" Denies any n,v. Reports feeling distended.        Past Medical History:   Diagnosis Date    Allergic     Asthma     Depression       No past surgical history on file.   Family History   Problem Relation Age of Onset    No Known Problems Mother     Addiction problem Father         Alcoholic    Asthma Sister     Asthma Maternal Grandmother     Diabetes Maternal Grandmother     Heart disease Maternal Grandmother       Social History     Tobacco Use    Smoking status: Every Day     Types: E-Cigarettes    Smokeless tobacco: Never    Tobacco comments:     vaping   Vaping Use    Vaping status: Every Day    Substances: Nicotine   Substance Use Topics    Alcohol use: Never    Drug use: Never      E-Cigarette/Vaping    E-Cigarette Use Current Every Day User       E-Cigarette/Vaping Substances    Nicotine Yes     THC No     CBD No     Flavoring No     Other No     Unknown No       I have reviewed and agree with the history as documented.     This is a 19-year-old female who presents with sharp intermittent left-sided abdominal pain and constipation over the past 4 days denies any nausea vomiting or urinary symptoms no fevers chills or prior surgeries      History provided by:  Patient  Medical Problem  Location:  Left abdomen  Quality:  Sharp pain  Severity:  Moderate  Onset quality:  Gradual  Duration:  4 days  Timing:  Intermittent  Progression:  Waxing and waning  Chronicity:  New  Context:  Left-sided abdominal pain on and off for the past 4 days  Associated symptoms: abdominal pain    Associated symptoms: no nausea and no vomiting        Review of Systems   Gastrointestinal:  Positive for abdominal pain and constipation. Negative for nausea and vomiting.   All other systems reviewed and are negative.          Objective       ED Triage Vitals [12/06/24 1111]   Temperature Pulse Blood Pressure Respirations SpO2 Patient Position - Orthostatic VS "   97.6 °F (36.4 °C) 102 112/75 20 100 % Sitting      Temp Source Heart Rate Source BP Location FiO2 (%) Pain Score    Temporal Monitor Left arm --       Vitals      Date and Time Temp Pulse SpO2 Resp BP Pain Score FACES Pain Rating User   12/06/24 1400 -- 85 98 % 16 120/72 -- -- RD   12/06/24 1330 -- 89 100 % 18 122/68 -- -- RD   12/06/24 1300 -- 83 100 % 16 119/74 -- -- RD   12/06/24 1230 -- 80 100 % 18 117/70 -- -- RD   12/06/24 1200 -- 85 100 % 16 116/68 -- -- RD   12/06/24 1111 97.6 °F (36.4 °C) 102 100 % 20 112/75 9 --             Physical Exam  Vitals and nursing note reviewed.   Constitutional:       General: She is not in acute distress.     Appearance: She is not toxic-appearing.   HENT:      Head: Normocephalic and atraumatic.      Right Ear: External ear normal.      Left Ear: External ear normal.      Mouth/Throat:      Mouth: Mucous membranes are dry.   Eyes:      General:         Right eye: No discharge.         Left eye: No discharge.      Extraocular Movements: Extraocular movements intact.      Pupils: Pupils are equal, round, and reactive to light.   Cardiovascular:      Rate and Rhythm: Normal rate and regular rhythm.      Pulses: Normal pulses.      Heart sounds: No murmur heard.     No friction rub. No gallop.   Pulmonary:      Effort: Pulmonary effort is normal. No respiratory distress.      Breath sounds: No stridor. No wheezing, rhonchi or rales.   Abdominal:      General: There is no distension.      Palpations: Abdomen is soft. There is no mass.      Tenderness: There is abdominal tenderness. There is no guarding or rebound.      Comments: Left lower quadrant tenderness without peritoneal findings   Musculoskeletal:         General: No swelling, tenderness, deformity or signs of injury. Normal range of motion.      Cervical back: Normal range of motion and neck supple. No rigidity.      Right lower leg: No edema.      Left lower leg: No edema.   Skin:     General: Skin is warm and dry.       Coloration: Skin is not jaundiced.      Findings: No bruising.   Neurological:      General: No focal deficit present.      Mental Status: She is alert and oriented to person, place, and time.      Cranial Nerves: No cranial nerve deficit.   Psychiatric:         Mood and Affect: Mood normal.         Behavior: Behavior normal.         Thought Content: Thought content normal.         Results Reviewed       Procedure Component Value Units Date/Time    Urine Microscopic [[id number]]  (Abnormal) Collected: 12/06/24 1221    Lab Status: Final result Specimen: Urine, Clean Catch Updated: 12/06/24 1253     RBC, UA 2-4 /hpf      WBC, UA 4-10 /hpf      Epithelial Cells Occasional /hpf      Bacteria, UA Occasional /hpf     UA w Reflex to Microscopic w Reflex to Culture [[id number]]  (Abnormal) Collected: 12/06/24 1221    Lab Status: Final result Specimen: Urine, Clean Catch Updated: 12/06/24 1247     Color, UA Yellow     Clarity, UA Clear     Specific Gravity, UA 1.020     pH, UA 6.5     Leukocytes, UA Negative     Nitrite, UA Negative     Protein, UA Trace mg/dl      Glucose, UA Negative mg/dl      Ketones, UA Negative mg/dl      Urobilinogen, UA <2.0 mg/dl      Bilirubin, UA Negative     Occult Blood, UA Negative    POCT pregnancy, urine [[id number]]  (Normal) Collected: 12/06/24 1222    Lab Status: Final result Updated: 12/06/24 1222     EXT Preg Test, Ur Negative     Control Valid    TSH, 3rd generation with Free T4 reflex [[id number]]  (Normal) Collected: 12/06/24 1128    Lab Status: Final result Specimen: Blood from Arm, Left Updated: 12/06/24 1212     TSH 3RD GENERATON 0.503 uIU/mL     Comprehensive metabolic panel [[id number]] Collected: 12/06/24 1128    Lab Status: Final result Specimen: Blood from Arm, Left Updated: 12/06/24 1153     Sodium 136 mmol/L      Potassium 4.0 mmol/L      Chloride 103 mmol/L      CO2 28 mmol/L      ANION GAP 5 mmol/L      BUN 13 mg/dL      Creatinine 0.60 mg/dL      Glucose 103 mg/dL       Calcium 8.8 mg/dL      AST 15 U/L      ALT 10 U/L      Alkaline Phosphatase 57 U/L      Total Protein 6.9 g/dL      Albumin 4.7 g/dL      Total Bilirubin 0.54 mg/dL      eGFR 131 ml/min/1.73sq m     Narrative:      National Kid[employer] Disease Foundation guidelines for Chronic Kidney Disease (CKD):     Stage 1 with normal or high GFR (GFR > 90 mL/min/1.73 square meters)    Stage 2 Mild CKD (GFR = 60-89 mL/min/1.73 square meters)    Stage 3A Moderate CKD (GFR = 45-59 mL/min/1.73 square meters)    Stage 3B Moderate CKD (GFR = 30-44 mL/min/1.73 square meters)    Stage 4 Severe CKD (GFR = 15-29 mL/min/1.73 square meters)    Stage 5 End Stage CKD (GFR <15 mL/min/1.73 square meters)  Note: GFR calculation is accurate only with a steady state creatinine    Lipase [[id number]]  (Normal) Collected: 12/06/24 1128    Lab Status: Final result Specimen: Blood from Arm, Left Updated: 12/06/24 1153     Lipase 34 u/L     CBC and differential [[id number]]  (Abnormal) Collected: 12/06/24 1128    Lab Status: Final result Specimen: Blood from Arm, Left Updated: 12/06/24 1135     WBC 7.74 Thousand/uL      RBC 4.52 Million/uL      Hemoglobin 13.3 g/dL      Hematocrit 39.8 %      MCV 88 fL      MCH 29.4 pg      MCHC 33.4 g/dL      RDW 12.9 %      MPV 10.3 fL      Platelets 236 Thousands/uL      nRBC 0 /100 WBCs      Segmented % 84 %      Immature Grans % 1 %      Lymphocytes % 11 %      Monocytes % 3 %      Eosinophils Relative 1 %      Basophils Relative 0 %      Absolute Neutrophils 6.53 Thousands/µL      Absolute Immature Grans 0.04 Thousand/uL      Absolute Lymphocytes 0.86 Thousands/µL      Absolute Monocytes 0.25 Thousand/µL      Eosinophils Absolute 0.04 Thousand/µL      Basophils Absolute 0.02 Thousands/µL             CT abdomen pelvis with contrast   Final Interpretation by Bernardo Alas MD (12/06 1418)      1. Large volume of stool throughout the colon also distending the rectum correlating with clinical history of constipation.       Otherwise, no acute findings in the abdomen or pelvis.      2. A 1.7 cm hypoattenuating lesion in the left kidney midportion is incompletely characterized but stable since 9/16/2021 and therefore probably due to a hemorrhagic/proteinaceous cyst. A 1.3 cm hypoattenuating lesion in the left kidney midportion may be    due to a proteinaceous cyst but is also indeterminate without unenhanced images. Nonemergent renal ultrasound can be performed to evaluate whether these represent cysts.         The study was marked in EPIC for immediate notification.            Resident: Phil Guerin I, the attending radiologist, have reviewed the images and agree with the final report above.      Workstation performed: [id number]             Procedures    ED Medication and Procedure Management   Prior to Admission Medications   Prescriptions Last Dose Informant Patient Reported? Taking?   EPINEPHrine (EPIPEN) 0.3 mg/0.3 mL SOAJ  Self No No   Sig: Inject 0.3 mL (0.3 mg total) into a muscle once for 1 dose   adapalene (DIFFERIN) 0.1 % gel   No No   Sig: Apply topically daily at bedtime   fluconazole (DIFLUCAN) 150 mg tablet   Yes No   Patient not taking: Reported on 11/5/2024      Facility-Administered Medications: None     Discharge Medication List as of 12/6/2024  2:50 PM        CONTINUE these medications which have NOT CHANGED    Details   adapalene (DIFFERIN) 0.1 % gel Apply topically daily at bedtime, Starting Tue 11/5/2024, Until Wed 11/5/2025, Normal      EPINEPHrine (EPIPEN) 0.3 mg/0.3 mL SOAJ Inject 0.3 mL (0.3 mg total) into a muscle once for 1 dose, Starting Sun 4/16/2023, Normal      fluconazole (DIFLUCAN) 150 mg tablet Historical Med      QUEtiapine (SEROquel) 100 mg tablet Take 2 tablets at bedtime (for a total of 200 mg) daily, Normal      venlafaxine (EFFEXOR-XR) 75 mg 24 hr capsule Take 1 capsule (75 mg total) by mouth daily, Starting Tue 11/5/2024, Normal           No discharge procedures on file.  ED  SEPSIS DOCUMENTATION   Time reflects when diagnosis was documented in both MDM as applicable and the Disposition within this note       Time User Action Codes Description Comment    12/6/2024  2:49 PM Sixto Hills [R10.9] Abdominal pain     12/6/2024  2:49 PM Sixto Hills [K59.00] Constipation     12/6/2024  2:50 PM Sixto Hills [N28.1] Renal cyst, left                  Sixto Hills DO  12/06/24 2055

## 2024-12-06 NOTE — TELEPHONE ENCOUNTER
Patient contacted the office to schedule a follow up visit with provider. Patient is now scheduled for 12/24  at 3:30  in office.

## 2024-12-06 NOTE — DISCHARGE INSTRUCTIONS
Must see family doctor next week and get set up for outpatient ultrasound for further evaluation of possible cystic structures seen on your CAT scan today

## 2024-12-09 ENCOUNTER — OFFICE VISIT (OUTPATIENT)
Dept: FAMILY MEDICINE CLINIC | Facility: CLINIC | Age: 19
End: 2024-12-09
Payer: COMMERCIAL

## 2024-12-09 ENCOUNTER — TELEPHONE (OUTPATIENT)
Dept: PSYCHIATRY | Facility: CLINIC | Age: 19
End: 2024-12-09

## 2024-12-09 VITALS
BODY MASS INDEX: 18.28 KG/M2 | TEMPERATURE: 98.7 F | OXYGEN SATURATION: 99 % | RESPIRATION RATE: 16 BRPM | HEIGHT: 61 IN | DIASTOLIC BLOOD PRESSURE: 68 MMHG | HEART RATE: 81 BPM | SYSTOLIC BLOOD PRESSURE: 104 MMHG | WEIGHT: 96.8 LBS

## 2024-12-09 DIAGNOSIS — F32.2 CURRENT SEVERE EPISODE OF MAJOR DEPRESSIVE DISORDER WITHOUT PSYCHOTIC FEATURES WITHOUT PRIOR EPISODE (HCC): ICD-10-CM

## 2024-12-09 DIAGNOSIS — Z00.00 ANNUAL PHYSICAL EXAM: Primary | ICD-10-CM

## 2024-12-09 DIAGNOSIS — N28.9 RENAL LESION: ICD-10-CM

## 2024-12-09 PROCEDURE — 99395 PREV VISIT EST AGE 18-39: CPT | Performed by: FAMILY MEDICINE

## 2024-12-09 PROCEDURE — 99214 OFFICE O/P EST MOD 30 MIN: CPT | Performed by: FAMILY MEDICINE

## 2024-12-09 NOTE — PROGRESS NOTES
Adult Annual Physical  Name: Nato Matta      : 2005      MRN: [id number]  Encounter Provider: Ciara Ram DO  Encounter Date: 2024   Encounter department: [facility]    Assessment & Plan  Current severe episode of major depressive disorder without psychotic features without prior episode (HCC)  Depression Screening Follow-up Plan: Patient's depression screening was positive with a PHQ-9 score of 18. Continue regular follow-up with their psychologist/therapist/psychiatrist who is managing their mental health condition(s).    She is on medication per psychiatry          Renal lesion  Ct done in the ER showed likely left renal cyst  Recommended follow up ultrasound  Will order today  Once results are back we will review with patient and decide on further follow up as that time.     Orders:    US kidney and bladder with pvr; Future    Annual physical exam         Immunizations and preventive care screenings were discussed with patient today. Appropriate education was printed on patient's after visit summary.    Counseling:  Alcohol/drug use: discussed moderation in alcohol intake, the recommendations for healthy alcohol use, and avoidance of illicit drug use.  Dental Health: discussed importance of regular tooth brushing, flossing, and dental visits.  Injury prevention: discussed safety/seat belts, safety helmets, smoke detectors, carbon monoxide detectors, and smoking near bedding or upholstery.  Sexual health: discussed sexually transmitted diseases, partner selection, use of condoms, avoidance of unintended pregnancy, and contraceptive alternatives.  Exercise: the importance of regular exercise/physical activity was discussed. Recommend exercise 3-5 times per week for at least 30 minutes.          History of Present Illness     Adult Annual Physical:  Patient presents for annual physical.     Diet and Physical Activity:  - Diet/Nutrition: well balanced  diet.    Depression Screening:    - PHQ-9 Score: 18    General Health:    - Dental: regular dental visits.    /GYN Health:  - Follows with GYN: yes.     Review of Systems   Constitutional:  Negative for chills, fatigue and fever.   HENT:  Negative for congestion, postnasal drip, rhinorrhea and sinus pressure.    Eyes:  Negative for photophobia and visual disturbance.   Respiratory:  Negative for cough and shortness of breath.    Cardiovascular:  Negative for chest pain, palpitations and leg swelling.   Gastrointestinal:  Negative for abdominal pain, constipation, diarrhea, nausea and vomiting.   Genitourinary:  Positive for dysuria. Negative for difficulty urinating.   Musculoskeletal:  Negative for arthralgias and myalgias.   Skin:  Negative for color change and rash.   Neurological:  Negative for dizziness, weakness, light-headedness and headaches.     Pertinent Medical History          Medical History Reviewed by provider this encounter:  Tobacco  Allergies  Meds  Problems  Med Hx  Surg Hx  Fam Hx     .  Past Medical History   Past Medical History:   Diagnosis Date    Allergic     Asthma     Depression      History reviewed. No pertinent surgical history.  Family History   Problem Relation Age of Onset    No Known Problems Mother     Addiction problem Father         Alcoholic    Asthma Sister     Asthma Maternal Grandmother     Diabetes Maternal Grandmother     Heart disease Maternal Grandmother       reports that she has never smoked. She has never used smokeless tobacco. She reports that she does not drink alcohol and does not use drugs.  Current Outpatient Medications on File Prior to Visit   Medication Sig Dispense Refill    adapalene (DIFFERIN) 0.1 % gel Apply topically daily at bedtime 45 g 6    EPINEPHrine (EPIPEN) 0.3 mg/0.3 mL SOAJ Inject 0.3 mL (0.3 mg total) into a muscle once for 1 dose 0.6 mL 0    QUEtiapine (SEROquel) 100 mg tablet Take 2 tablets at bedtime (for a total of 200 mg) daily 60  "tablet 0    venlafaxine (EFFEXOR-XR) 75 mg 24 hr capsule Take 1 capsule (75 mg total) by mouth daily 30 capsule 0    fluconazole (DIFLUCAN) 150 mg tablet  (Patient not taking: Reported on 12/9/2024)       No current facility-administered medications on file prior to visit.     Allergies   Allergen Reactions    Measles And Rubella Virus Vaccine Anaphylaxis, Hives and Shortness Of Breath    Cherry - Food Allergy     Peach [Prunus Persica]     Strawberry C [Ascorbate - Food Allergy] Itching     throat    Tomato - Food Allergy     Tree Extract Other (See Comments)     Done by blood test      Cucumber Extract - Food Allergy      Throat itchy      Nuts - Food Allergy Itching      Current Outpatient Medications on File Prior to Visit   Medication Sig Dispense Refill    adapalene (DIFFERIN) 0.1 % gel Apply topically daily at bedtime 45 g 6    EPINEPHrine (EPIPEN) 0.3 mg/0.3 mL SOAJ Inject 0.3 mL (0.3 mg total) into a muscle once for 1 dose 0.6 mL 0    QUEtiapine (SEROquel) 100 mg tablet Take 2 tablets at bedtime (for a total of 200 mg) daily 60 tablet 0    venlafaxine (EFFEXOR-XR) 75 mg 24 hr capsule Take 1 capsule (75 mg total) by mouth daily 30 capsule 0    fluconazole (DIFLUCAN) 150 mg tablet  (Patient not taking: Reported on 12/9/2024)       No current facility-administered medications on file prior to visit.      Social History     Tobacco Use    Smoking status: Never    Smokeless tobacco: Never    Tobacco comments:     vaping   Vaping Use    Vaping status: Every Day    Substances: Nicotine   Substance and Sexual Activity    Alcohol use: Never    Drug use: Never    Sexual activity: Yes     Partners: Female     Birth control/protection: None       Objective   /68 (BP Location: Left arm, Patient Position: Sitting, Cuff Size: Standard)   Pulse 81   Temp 98.7 °F (37.1 °C) (Tympanic)   Resp 16   Ht 5' 1.25\" (1.556 m)   Wt 43.9 kg (96 lb 12.8 oz)   LMP 11/22/2024 (Approximate)   SpO2 99%   BMI 18.14 kg/m² "     Physical Exam  Constitutional:       Appearance: She is well-developed.   HENT:      Head: Normocephalic and atraumatic.   Eyes:      Pupils: Pupils are equal, round, and reactive to light.   Cardiovascular:      Rate and Rhythm: Normal rate and regular rhythm.      Heart sounds: Normal heart sounds.   Pulmonary:      Effort: Pulmonary effort is normal. No respiratory distress.      Breath sounds: Normal breath sounds. No wheezing.   Abdominal:      General: Bowel sounds are normal. There is no distension.      Palpations: Abdomen is soft.      Tenderness: There is no abdominal tenderness.   Musculoskeletal:         General: No tenderness. Normal range of motion.      Cervical back: Normal range of motion and neck supple.   Skin:     General: Skin is warm and dry.   Neurological:      Mental Status: She is alert and oriented to person, place, and time.   Psychiatric:         Behavior: Behavior normal.

## 2024-12-09 NOTE — PATIENT INSTRUCTIONS
"Patient Education     Routine physical for adults   The Basics   Written by the doctors and editors at [facility]   What is a physical? -- A physical is a routine visit, or \"check-up,\" with your doctor. You might also hear it called a \"wellness visit\" or \"preventive visit.\"  During each visit, the doctor will:   Ask about your physical and mental health   Ask about your habits, behaviors, and lifestyle   Do an exam   Give you vaccines if needed   Talk to you about any medicines you take   Give advice about your health   Answer your questions  Getting regular check-ups is an important part of taking care of your health. It can help your doctor find and treat any problems you have. But it's also important for preventing health problems.  A routine physical is different from a \"sick visit.\" A sick visit is when you see a doctor because of a health concern or problem. Since physicals are scheduled ahead of time, you can think about what you want to ask the doctor.  How often should I get a physical? -- It depends on your age and health. In general, for people age 21 years and older:   If you are younger than 50 years, you might be able to get a physical every 3 years.   If you are 50 years or older, your doctor might recommend a physical every year.  If you have an ongoing health condition, like diabetes or high blood pressure, your doctor will probably want to see you more often.  What happens during a physical? -- In general, each visit will include:   Physical exam - The doctor or nurse will check your height, weight, heart rate, and blood pressure. They will also look at your eyes and ears. They will ask about how you are feeling and whether you have any symptoms that bother you.   Medicines - It's a good idea to bring a list of all the medicines you take to each doctor visit. Your doctor will talk to you about your medicines and answer any questions. Tell them if you are having any side effects that bother you. You " "should also tell them if you are having trouble paying for any of your medicines.   Habits and behaviors - This includes:   Your diet   Your exercise habits   Whether you smoke, drink alcohol, or use drugs   Whether you are sexually active   Whether you feel safe at home  Your doctor will talk to you about things you can do to improve your health and lower your risk of health problems. They will also offer help and support. For example, if you want to quit smoking, they can give you advice and might prescribe medicines. If you want to improve your diet or get more physical activity, they can help you with this, too.   Lab tests, if needed - The tests you get will depend on your age and situation. For example, your doctor might want to check your:   Cholesterol   Blood sugar   Iron level   Vaccines - The recommended vaccines will depend on your age, health, and what vaccines you already had. Vaccines are very important because they can prevent certain serious or deadly infections.   Discussion of screening - \"Screening\" means checking for diseases or other health problems before they cause symptoms. Your doctor can recommend screening based on your age, risk, and preferences. This might include tests to check for:   Cancer, such as breast, prostate, cervical, ovarian, colorectal, prostate, lung, or skin cancer   Sexually transmitted infections, such as chlamydia and gonorrhea   Mental health conditions like depression and anxiety  Your doctor will talk to you about the different types of screening tests. They can help you decide which screenings to have. They can also explain what the results might mean.   Answering questions - The physical is a good time to ask the doctor or nurse questions about your health. If needed, they can refer you to other doctors or specialists, too.  Adults older than 65 years often need other care, too. As you get older, your doctor will talk to you about:   How to prevent falling at " home   Hearing or vision tests   Memory testing   How to take your medicines safely   Making sure that you have the help and support you need at home  All topics are updated as new evidence becomes available and our peer review process is complete.  This topic retrieved from [facility] on: May 02, 2024.  Topic 274515 Version 1.0  Release: 32.4.3 - C32.122  © 2024 UpToDate, Inc. and/or its affiliates. All rights reserved.  Consumer Information Use and Disclaimer   Disclaimer: This generalized information is a limited summary of diagnosis, treatment, and/or medication information. It is not meant to be comprehensive and should be used as a tool to help the user understand and/or assess potential diagnostic and treatment options. It does NOT include all information about conditions, treatments, medications, side effects, or risks that may apply to a specific patient. It is not intended to be medical advice or a substitute for the medical advice, diagnosis, or treatment of a health care provider based on the health care provider's examination and assessment of a patient's specific and unique circumstances. Patients must speak with a health care provider for complete information about their health, medical questions, and treatment options, including any risks or benefits regarding use of medications. This information does not endorse any treatments or medications as safe, effective, or approved for treating a specific patient. UpToDate, Inc. and its affiliates disclaim any warranty or liability relating to this information or the use thereof.The use of this information is governed by the Terms of Use, available at https://www.woltersNeediumuwer.com/en/know/clinical-effectiveness-terms. 2024© UpToDate, Inc. and its affiliates and/or licensors. All rights reserved.  Copyright   © 2024 UpToDate, Inc. and/or its affiliates. All rights reserved.

## 2024-12-09 NOTE — ASSESSMENT & PLAN NOTE
Depression Screening Follow-up Plan: Patient's depression screening was positive with a PHQ-9 score of 18. Continue regular follow-up with their psychologist/therapist/psychiatrist who is managing their mental health condition(s).    She is on medication per psychiatry

## 2024-12-09 NOTE — ASSESSMENT & PLAN NOTE
Ct done in the ER showed likely left renal cyst  Recommended follow up ultrasound  Will order today  Once results are back we will review with patient and decide on further follow up as that time.     Orders:    US kidney and bladder with pvr; Future

## 2024-12-10 ENCOUNTER — TELEPHONE (OUTPATIENT)
Dept: PSYCHIATRY | Facility: CLINIC | Age: 19
End: 2024-12-10

## 2024-12-11 LAB
BACTERIA UR CULT: NORMAL
Lab: NO GROWTH

## 2024-12-12 ENCOUNTER — SOCIAL WORK (OUTPATIENT)
Dept: BEHAVIORAL/MENTAL HEALTH CLINIC | Facility: CLINIC | Age: 19
End: 2024-12-12
Payer: COMMERCIAL

## 2024-12-12 DIAGNOSIS — F41.1 GENERALIZED ANXIETY DISORDER: Primary | ICD-10-CM

## 2024-12-12 PROCEDURE — 90837 PSYTX W PT 60 MINUTES: CPT

## 2024-12-12 NOTE — PSYCH
"Behavioral Health Psychotherapy Progress Note    Psychotherapy Provided: Individual Psychotherapy     1. Generalized anxiety disorder            Goals addressed in session: Goal 1     DATA: Client and therapist met in office for session. Client shared that her grandfather who she is having difficulty staying in touch with, has been diagnosed with cancer. Therapist and client spent time processing the news, discussing how she felt abut it, what she felt she could do. Client shared frustration as she was not told directly about this and shared frustration as she is attempting to stay connected to her grandparents but due to her father living there, are not reciprocating. Client and therapist brainstormed ideas such as calling rather than texting, driving by to see if they were home, texting again. Client shared she wanted to try again as the holidays were coming up and wanted to have that relationship. Client and therapist discussed ideas too such as reaching out to other family too. Client did share that if she tries and does not get a response, then she will stop. Therapist validated feelings and shared empathy with client during difficult discussion. Client and therapist then spent time discussing her work, feeling like a co-worker was trying to get her in trouble. Client shared that she was upset about it and felt an overall feeling of \"abandonment\" from family and co-workers who she believed were friends. Therapist and client spent time processing that feeling as well as discussing coping skills/strategies, and agreed for her to try and continue her shifts but that client was interested in looking for other possible jobs. Client shared she would start looking online to apply other places.   During this session, this clinician used the following therapeutic modalities: Client-centered Therapy    Substance Abuse was not addressed during this session. If the client is diagnosed with a co-occurring substance use " "disorder, please indicate any changes in the frequency or amount of use:  Stage of change for addressing substance use diagnoses: No substance use/Not applicable    ASSESSMENT:  Nato Matta presents with a Euthymic/ normal mood.     her affect is Normal range and intensity, which is congruent, with her mood and the content of the session. The client has made progress on their goals.     Nato Matta presents with a none risk of suicide, none risk of self-harm, and none risk of harm to others.    For any risk assessment that surpasses a \"low\" rating, a safety plan must be developed.    A safety plan was indicated: no  If yes, describe in detail crisis plan on file    PLAN: Between sessions, Nato Matta will begin looking for other possible jobs, reach out to her family in different ways. At the next session, the therapist will use Client-centered Therapy to address anxiety, feeling of being left out.    Behavioral Health Treatment Plan and Discharge Planning: Nato Matta is aware of and agrees to continue to work on their treatment plan. They have identified and are working toward their discharge goals. yes    Depression Follow-up Plan Completed: No    Visit start and stop times:    12/12/24  Start Time: 1001  Stop Time: 1058  Total Visit Time: 57 minutes  "

## 2024-12-15 ENCOUNTER — RESULTS FOLLOW-UP (OUTPATIENT)
Dept: FAMILY MEDICINE CLINIC | Facility: CLINIC | Age: 19
End: 2024-12-15

## 2024-12-19 ENCOUNTER — SOCIAL WORK (OUTPATIENT)
Dept: BEHAVIORAL/MENTAL HEALTH CLINIC | Facility: CLINIC | Age: 19
End: 2024-12-19
Payer: COMMERCIAL

## 2024-12-19 DIAGNOSIS — F41.1 GENERALIZED ANXIETY DISORDER: Primary | ICD-10-CM

## 2024-12-19 PROCEDURE — 90837 PSYTX W PT 60 MINUTES: CPT

## 2024-12-19 NOTE — PSYCH
Behavioral Health Psychotherapy Progress Note    Psychotherapy Provided: Individual Psychotherapy     1. Generalized anxiety disorder            Goals addressed in session: Goal 1     DATA: Client and therapist discussed how client was doing. Client shared feeling happy, reporting that she was invited by her aunt to come to [place] and that her grandparents will be there. Client reported that she wants to go and is happy to be invited however reported working on her boundaries when there, reporting that she is always asks to see them and that they do not reciprocate the feelings. Client reports her dissatisfaction with recent treatment but that due to her grandfather having cancer, feels like she has to attend. Client shared that she is looking forward to being with family and wants to try and talk her grandparents in a nice way. Client and therapist discussed plans for the holidays, that client feels heard and listened that mom told her sister's  not to attend as it makes client uncomfortable. Client reports feeling validated and happy with the decision to then see her sister. Client and therapist discussed that her job was going better and is hopeful that she will have a good holiday.   During this session, this clinician used the following therapeutic modalities: Client-centered Therapy    Substance Abuse was not addressed during this session. If the client is diagnosed with a co-occurring substance use disorder, please indicate any changes in the frequency or amount of use:  Stage of change for addressing substance use diagnoses: No substance use/Not applicable    ASSESSMENT:  Nato Matta presents with a Euthymic/ normal mood.     her affect is Normal range and intensity, which is congruent, with her mood and the content of the session. The client has made progress on their goals.     Nato Matta presents with a none risk of suicide, none risk of self-harm, and none risk of harm to others.    For  "any risk assessment that surpasses a \"low\" rating, a safety plan must be developed.    A safety plan was indicated: no  If yes, describe in detail crisis plan on file    PLAN: Between sessions, Nato Matta will try to have conversations with family to try and express her feelings . At the next session, the therapist will use Client-centered Therapy to address anxiety.   Behavioral Health Treatment Plan and Discharge Planning: Nato Matta is aware of and agrees to continue to work on their treatment plan. They have identified and are working toward their discharge goals. yes    Depression Follow-up Plan Completed: No    Visit start and stop times:    12/19/24  Start Time: 1000  Stop Time: 1056  Total Visit Time: 56 minutes  "

## 2024-12-24 ENCOUNTER — TELEPHONE (OUTPATIENT)
Dept: PSYCHIATRY | Facility: CLINIC | Age: 19
End: 2024-12-24

## 2024-12-24 NOTE — TELEPHONE ENCOUNTER
Writer called and left voicemail asking client to reschedule 12/24/24 appointment as her insurance will not cover virtual when provider is [facility] and Marie was unable to make it in today due to the weather.

## 2025-01-02 ENCOUNTER — SOCIAL WORK (OUTPATIENT)
Dept: BEHAVIORAL/MENTAL HEALTH CLINIC | Facility: CLINIC | Age: 20
End: 2025-01-02
Payer: COMMERCIAL

## 2025-01-02 DIAGNOSIS — F41.1 GENERALIZED ANXIETY DISORDER: Primary | ICD-10-CM

## 2025-01-02 DIAGNOSIS — F41.1 GENERALIZED ANXIETY DISORDER: ICD-10-CM

## 2025-01-02 DIAGNOSIS — F32.2 CURRENT SEVERE EPISODE OF MAJOR DEPRESSIVE DISORDER WITHOUT PSYCHOTIC FEATURES WITHOUT PRIOR EPISODE (HCC): ICD-10-CM

## 2025-01-02 PROCEDURE — 90837 PSYTX W PT 60 MINUTES: CPT

## 2025-01-02 RX ORDER — VENLAFAXINE HYDROCHLORIDE 75 MG/1
75 CAPSULE, EXTENDED RELEASE ORAL DAILY
Qty: 30 CAPSULE | Refills: 0 | Status: SHIPPED | OUTPATIENT
Start: 2025-01-02

## 2025-01-02 RX ORDER — QUETIAPINE FUMARATE 100 MG/1
TABLET, FILM COATED ORAL
Qty: 60 TABLET | Refills: 0 | Status: SHIPPED | OUTPATIENT
Start: 2025-01-02

## 2025-01-02 NOTE — PSYCH
"Behavioral Health Psychotherapy Progress Note    Psychotherapy Provided: Individual Psychotherapy     1. Generalized anxiety disorder            Goals addressed in session: Goal 1     DATA: Client and therapist discussed how client was doing. Client shared update of doing well and then reflected at length abou there holiday and seeing family that she has not in a while. Client reflected that her brother did not join to [facility] client shared questioning why he won't leave his house or drive a car anymore. Client and therapist discussed that client could try to ask him and share her support as it may help her brother. Client and therapist discussed that she had a nice time with her sister and refelcted on their conversation. Client shared not getting along with her brother in law and that her sister is emotionally disconnected. Client shared her feelings on that as well as discussed she is hopeful for her sister to get out of her \"bad marriage\". Client and therapist discussed about the holiday more at length to which client reported that she was having feelings against her grandparents sharing that they did not invite her directly and her feeling upset about that but also that when she saw them, there was not much excitement of seeing each other. Client will try to talk with brother for more added support.   During this session, this clinician used the following therapeutic modalities: Client-centered Therapy    Substance Abuse was not addressed during this session. If the client is diagnosed with a co-occurring substance use disorder, please indicate any changes in the frequency or amount of use:  Stage of change for addressing substance use diagnoses: No substance use/Not applicable    ASSESSMENT:  Nato Matta presents with a Euthymic/ normal mood.     her affect is Normal range and intensity, which is congruent, with her mood and the content of the session. The client has made progress on their goals.     Nato " "Paxton presents with a none risk of suicide, none risk of self-harm, and none risk of harm to others.    For any risk assessment that surpasses a \"low\" rating, a safety plan must be developed.    A safety plan was indicated: no  If yes, describe in detail crisis plan on file    PLAN: Between sessions, Nato Matta will continue to utilize coping skills when feeling anxious. At the next session, the therapist will use Client-centered Therapy to address anxiety.    Behavioral Health Treatment Plan and Discharge Planning: Nato Matta is aware of and agrees to continue to work on their treatment plan. They have identified and are working toward their discharge goals. yes    Depression Follow-up Plan Completed: No    Visit start and stop times:    01/03/25  Start Time: 1000  Stop Time: 1054  Total Visit Time: 54 minutes  "

## 2025-01-09 ENCOUNTER — SOCIAL WORK (OUTPATIENT)
Dept: BEHAVIORAL/MENTAL HEALTH CLINIC | Facility: CLINIC | Age: 20
End: 2025-01-09
Payer: COMMERCIAL

## 2025-01-09 DIAGNOSIS — F41.1 GENERALIZED ANXIETY DISORDER: Primary | ICD-10-CM

## 2025-01-09 DIAGNOSIS — F32.2 CURRENT SEVERE EPISODE OF MAJOR DEPRESSIVE DISORDER WITHOUT PSYCHOTIC FEATURES WITHOUT PRIOR EPISODE (HCC): ICD-10-CM

## 2025-01-09 PROCEDURE — 90834 PSYTX W PT 45 MINUTES: CPT

## 2025-01-09 NOTE — PSYCH
"Behavioral Health Psychotherapy Progress Note    Psychotherapy Provided: Individual Psychotherapy     1. Generalized anxiety disorder        2. 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 Client reported that she was doing well however her grandfather was starting Chemo today. Client and therapist processed through text message she received from mom, shared feeling hurt that it did not directly come to her and was unsure of how to respond. Client and therapist processed through message. Client shared overall doing well, shared job going well, wanting to work on getting her brother to leave the house but described struggle with doing so.   During this session, this clinician used the following therapeutic modalities: Client-centered Therapy    Substance Abuse was not addressed during this session. If the client is diagnosed with a co-occurring substance use disorder, please indicate any changes in the frequency or amount of use:  Stage of change for addressing substance use diagnoses: No substance use/Not applicable    ASSESSMENT:  Nato Matta presents with a Euthymic/ normal mood.     her affect is Normal range and intensity, which is congruent, with her mood and the content of the session. The client has made progress on their goals.     Nato Matta presents with a none risk of suicide, none risk of self-harm, and none risk of harm to others.    For any risk assessment that surpasses a \"low\" rating, a safety plan must be developed.    A safety plan was indicated: no  If yes, describe in detail on file    PLAN: Between sessions, Nato Matta will continue to explore coping skills. At the next session, the therapist will use Client-centered Therapy to address anxiety.    Behavioral Health Treatment Plan and Discharge Planning: Nato Matta is aware of and " agrees to continue to work on their treatment plan. They have identified and are working toward their discharge goals. yes    Depression Follow-up Plan Completed: No    Visit start and stop times:    01/10/25  Start Time: 1001  Stop Time: 1049  Total Visit Time: 48 minutes

## 2025-01-16 ENCOUNTER — SOCIAL WORK (OUTPATIENT)
Dept: BEHAVIORAL/MENTAL HEALTH CLINIC | Facility: CLINIC | Age: 20
End: 2025-01-16

## 2025-01-16 DIAGNOSIS — Z91.199 NO-SHOW FOR APPOINTMENT: Primary | ICD-10-CM

## 2025-01-16 PROCEDURE — NOSHOW

## 2025-01-17 PROBLEM — Z91.199 NO-SHOW FOR APPOINTMENT: Status: ACTIVE | Noted: 2025-01-17

## 2025-01-17 NOTE — PSYCH
No Call. No Show. No Charge    Nato Alessiodenis no showed 01/16/2025 appointment , staff called and left message to reschedule appointment     Treatment Plan not due at this session.

## 2025-01-19 ENCOUNTER — APPOINTMENT (EMERGENCY)
Dept: RADIOLOGY | Facility: HOSPITAL | Age: 20
End: 2025-01-19
Payer: COMMERCIAL

## 2025-01-19 ENCOUNTER — HOSPITAL ENCOUNTER (EMERGENCY)
Facility: HOSPITAL | Age: 20
Discharge: HOME | End: 2025-01-19
Attending: EMERGENCY MEDICINE
Payer: COMMERCIAL

## 2025-01-19 VITALS
HEART RATE: 90 BPM | HEIGHT: 60 IN | TEMPERATURE: 98.6 F | SYSTOLIC BLOOD PRESSURE: 130 MMHG | RESPIRATION RATE: 18 BRPM | BODY MASS INDEX: 19.24 KG/M2 | WEIGHT: 98 LBS | OXYGEN SATURATION: 99 % | DIASTOLIC BLOOD PRESSURE: 85 MMHG

## 2025-01-19 DIAGNOSIS — K59.00 CONSTIPATION: Primary | ICD-10-CM

## 2025-01-19 PROCEDURE — 74022 RADEX COMPL AQT ABD SERIES: CPT

## 2025-01-19 PROCEDURE — 99283 EMERGENCY DEPT VISIT LOW MDM: CPT | Performed by: PHYSICIAN ASSISTANT

## 2025-01-19 PROCEDURE — 99283 EMERGENCY DEPT VISIT LOW MDM: CPT

## 2025-01-19 NOTE — ED PROVIDER NOTES
"Time reflects when diagnosis was documented in both MDM as applicable and the Disposition within this note       Time User Action Codes Description Comment    1/19/2025  4:18 PM Marta Mccarty Add [K59.00] Constipation           ED Disposition       ED Disposition   Discharge    Condition   Stable    Date/Time   Sun Jan 19, 2025  5:10 PM    Comment   Nato Matta discharge to home/self care.                   Assessment & Plan       Medical Decision Making  Patient with constipation, no abdominal pain or N/V, no concern for obstruction.  Will give enema and reassess.  Will refer to GI doctor for further evaluation of change in bowel habits.     Amount and/or Complexity of Data Reviewed  Radiology: ordered and independent interpretation performed.             Medications - No data to display    ED Risk Strat Scores                                              History of Present Illness       Chief Complaint   Patient presents with    Constipation     Pt reports constipation x1 week. Pt reports recent visit for same. Pt states \"today I came cause I can't get my tampon in.\"       Past Medical History:   Diagnosis Date    Allergic     Asthma     Depression       History reviewed. No pertinent surgical history.   Family History   Problem Relation Age of Onset    No Known Problems Mother     Addiction problem Father         Alcoholic    Asthma Sister     Asthma Maternal Grandmother     Diabetes Maternal Grandmother     Heart disease Maternal Grandmother       Social History     Tobacco Use    Smoking status: Never    Smokeless tobacco: Never    Tobacco comments:     vaping   Vaping Use    Vaping status: Every Day    Substances: Nicotine   Substance Use Topics    Alcohol use: Never    Drug use: Never      E-Cigarette/Vaping    E-Cigarette Use Current Every Day User       E-Cigarette/Vaping Substances    Nicotine Yes     THC No     CBD No     Flavoring No     Other No     Unknown No       I have reviewed and agree with " the history as documented.       Constipation  Associated symptoms: no abdominal pain, no back pain, no dysuria, no fever, no nausea and no vomiting      Patient is a 18 y/o F that presents to the ED with constipation for over 1 week.  She took 2 laxatives, but it didn't help.  Patient states the same thing happened last month and she had an enema and was able to have a BM.  She did not f/u with GI.  No fevers/chills.  No abdominal pain, nausea or vomiting.  No new medications or changes in diet.  Patient states she was unable to insert her tampon today due to the constipation.     Review of Systems   Constitutional:  Negative for chills and fever.   Gastrointestinal:  Positive for constipation. Negative for abdominal pain, nausea and vomiting.   Genitourinary:  Negative for dysuria.   Musculoskeletal:  Negative for back pain and neck pain.   Skin:  Negative for color change, pallor and rash.   Neurological:  Negative for dizziness, weakness and numbness.   Psychiatric/Behavioral:  Negative for confusion.    All other systems reviewed and are negative.          Objective       ED Triage Vitals [01/19/25 1552]   Temperature Pulse Blood Pressure Respirations SpO2 Patient Position - Orthostatic VS   98.6 °F (37 °C) 93 125/80 18 100 % Sitting      Temp Source Heart Rate Source BP Location FiO2 (%) Pain Score    Temporal Monitor Right arm -- 8      Vitals      Date and Time Temp Pulse SpO2 Resp BP Pain Score FACES Pain Rating User   01/19/25 1552 98.6 °F (37 °C) 93 100 % 18 125/80 8 -- CM            Physical Exam  Vitals and nursing note reviewed.   Constitutional:       General: She is not in acute distress.     Appearance: Normal appearance. She is well-developed and well-groomed. She is not ill-appearing or diaphoretic.   HENT:      Head: Normocephalic and atraumatic.      Right Ear: External ear normal.      Left Ear: External ear normal.      Nose: Nose normal.      Mouth/Throat:      Mouth: Mucous membranes are  moist.   Eyes:      Conjunctiva/sclera: Conjunctivae normal.   Cardiovascular:      Rate and Rhythm: Normal rate and regular rhythm.      Heart sounds: Normal heart sounds.   Pulmonary:      Effort: Pulmonary effort is normal.      Breath sounds: Normal breath sounds. No wheezing, rhonchi or rales.   Abdominal:      General: Abdomen is flat. Bowel sounds are normal.      Palpations: Abdomen is soft.      Tenderness: There is no abdominal tenderness. There is no guarding or rebound.   Musculoskeletal:         General: Normal range of motion.      Cervical back: Normal range of motion.      Right lower leg: No edema.      Left lower leg: No edema.   Skin:     General: Skin is warm and dry.      Coloration: Skin is not jaundiced or pale.      Findings: No rash.   Neurological:      General: No focal deficit present.      Mental Status: She is alert and oriented to person, place, and time.      Motor: No weakness.   Psychiatric:         Mood and Affect: Mood normal.         Behavior: Behavior is cooperative.         Results Reviewed       None            XR abdomen obstruction series   Final Interpretation by Tez Roblero MD (01/19 1617)      Nonobstructive bowel gas pattern.   Moderate volume of formed stool      Workstation performed: [id number]             Procedures    ED Medication and Procedure Management   Prior to Admission Medications   Prescriptions Last Dose Informant Patient Reported? Taking?   EPINEPHrine (EPIPEN) 0.3 mg/0.3 mL SOAJ  Self No No   Sig: Inject 0.3 mL (0.3 mg total) into a muscle once for 1 dose   QUEtiapine (SEROquel) 100 mg tablet   No No   Sig: Take 2 tablets at bedtime (for a total of 200 mg) daily   adapalene (DIFFERIN) 0.1 % gel  Self No No   Sig: Apply topically daily at bedtime   fluconazole (DIFLUCAN) 150 mg tablet  Self Yes No   Patient not taking: Reported on 12/9/2024   venlafaxine (EFFEXOR-XR) 75 mg 24 hr capsule   No No   Sig: Take 1 capsule (75 mg total) by mouth  daily      Facility-Administered Medications: None     Patient's Medications   Discharge Prescriptions    No medications on file       ED SEPSIS DOCUMENTATION   Time reflects when diagnosis was documented in both MDM as applicable and the Disposition within this note       Time User Action Codes Description Comment    1/19/2025  4:18 PM Marta Mccarty Add [K59.00] Constipation                  Marta Mccarty PA-C  01/19/25 1409

## 2025-01-19 NOTE — DISCHARGE INSTRUCTIONS
Rest, increase fluids.  Use a capful of Miralax daily to prevent constipation.   High fiber diet.  Call GI doctor for a follow up appointment for possible colonoscopy.

## 2025-01-23 ENCOUNTER — OFFICE VISIT (OUTPATIENT)
Dept: GASTROENTEROLOGY | Facility: CLINIC | Age: 20
End: 2025-01-23
Payer: COMMERCIAL

## 2025-01-23 VITALS
WEIGHT: 98 LBS | HEIGHT: 61 IN | DIASTOLIC BLOOD PRESSURE: 64 MMHG | SYSTOLIC BLOOD PRESSURE: 101 MMHG | BODY MASS INDEX: 18.5 KG/M2

## 2025-01-23 DIAGNOSIS — R19.4 CHANGE IN BOWEL HABITS: ICD-10-CM

## 2025-01-23 DIAGNOSIS — K59.00 CONSTIPATION, UNSPECIFIED CONSTIPATION TYPE: Primary | ICD-10-CM

## 2025-01-23 PROCEDURE — 99204 OFFICE O/P NEW MOD 45 MIN: CPT | Performed by: PHYSICIAN ASSISTANT

## 2025-01-23 RX ORDER — MAGNESIUM CARB/ALUMINUM HYDROX 105-160MG
296 TABLET,CHEWABLE ORAL ONCE
Qty: 296 ML | Refills: 0 | Status: SHIPPED | OUTPATIENT
Start: 2025-01-23 | End: 2025-01-23

## 2025-01-23 RX ORDER — POLYETHYLENE GLYCOL 3350 17 G/17G
17 POWDER, FOR SOLUTION ORAL 2 TIMES DAILY
Qty: 850 G | Refills: 5 | Status: SHIPPED | OUTPATIENT
Start: 2025-01-23

## 2025-01-23 NOTE — PATIENT INSTRUCTIONS
-Take bottle of magnesium citrate for bowel cleanout then start MiraLAX twice daily for maintenance  -Drink lots of water, eat fiber in diet  -Consider colonoscopy at follow-up once constipation improved  -Talk to psychiatrist to see if medications contributing to constipation  -OV 6 weeks

## 2025-01-23 NOTE — PROGRESS NOTES
Name: Nato Matta      : 2005      MRN: [id number]  Encounter Provider: Barby Mccauley PA-C  Encounter Date: 2025   Encounter department: [facility]  :  Assessment & Plan  Constipation, unspecified constipation type  In the last 2 months patient noted change in bowel habits to severe constipation going 1 week without a bowel movement had 2 ER evaluations for constipation improved with enema.  Etiology unclear.  Labs including TSH unremarkable.  CT and obstruction series showed large stool in colon and rectum.  Patient does not drink a lot of water or eat a lot of fiber and was advised to do so.  Will give bottle of magnesium citrate for bowel cleanout and start MiraLAX twice daily for maintenance.  Will consider colonoscopy to evaluate change in bowel habits and to ensure no malignancy although doubt given her age once constipation improved. Pt agreeable to plan.  Patient also advised to discuss with psychiatrist as has was started on Seroquel and Effexor in the last few months both of which can cause constipation in 10% of patients.    Orders:  •  magnesium citrate (CITROMA) 1.745 g/30 mL oral solution; Take 296 mL by mouth once for 1 dose  •  polyethylene glycol (GLYCOLAX) 17 GM/SCOOP powder; Take 17 g by mouth 2 (two) times a day    Change in bowel habits  See constipation         Follow up: 6w OV    History of Present Illness   HPI  Nato Matta is a 19 y.o. female with PMH depression, asthma, anxiety, scoliosis being evaluated after 2 ER evaluations for change in bowel habits to constipation.  Never had endoscopy colonoscopy.  No family history of GI malignancy.    [facility] ED 2024 and 2025 for constipation given enema discharges and advised to see GI.    2024 TSH, CBC, CMP, lipase normal.    2024 CT A/P with contrast showed large volume stool in colon, renal lesion.    2025 obstruction series showed normal bowel gas pattern with moderate volume  "stool.    Patient reports at baseline has formed stool every 1 to 2 days without bleeding.  In the last 1 month developed constipation and can go a week without a bowel movement.  Since enema in ER on 1/19 has been going daily.  She tried Dulcolax as needed without improvement.  Not currently taking any laxatives.  Had abdominal pain bloating and flatulence when constipated improved with defecation.  Admits she does not drink enough water or eat enough fiber in her diet. Follows with psychiatry and has been on Seroquel and Effexor for the last few months both of which can cause constipation up to 10% of patients.  Eating okay and weight stable.  Denies dysphagia, reflux, vomiting.  Does vape nicotine daily and uses marijuana few times a week.    Review of Systems  Constitutional: denies fatigue, fever.  HEENT: denies visual disturbance, postnasal drip, sore throat.  Respiratory: denies cough, shortness of breath.  Cardiovascular: denies chest pain, leg swelling.  Gastrointestinal: as noted above in HPI.  : denies difficulty urinating, dysuria.  Musculoskeletal: denies arthralgias, back pain.  Neurological: denies dizziness, syncope.  Psychiatric: denies confusion, anxiety.       Objective   /64   Ht 5' 1\" (1.549 m)   Wt 44.5 kg (98 lb)   BMI 18.52 kg/m²      Physical Exam  Constitutional: Well-developed, no acute distress  HEENT: normocephalic, mucous membranes moist.  Neck: Supple  Skin: warm and dry  Respiratory: Lungs are clear to auscultation B/L.  Cardiovascular: Heart is regular rate and rhythm.  Gastrointestinal: Soft, nontender, nondistended with normal active bowel sounds.  No masses, guarding, rebound.   Rectal Exam: Deferred.  Extremities: No edema.  Neurologic: Nonfocal. A & O ×3.   Psychiatric: Normal affect.      "

## 2025-01-30 ENCOUNTER — SOCIAL WORK (OUTPATIENT)
Dept: BEHAVIORAL/MENTAL HEALTH CLINIC | Facility: CLINIC | Age: 20
End: 2025-01-30
Payer: COMMERCIAL

## 2025-01-30 DIAGNOSIS — F41.1 GENERALIZED ANXIETY DISORDER: Primary | ICD-10-CM

## 2025-01-30 PROCEDURE — 90837 PSYTX W PT 60 MINUTES: CPT

## 2025-01-30 NOTE — PSYCH
Behavioral Health Psychotherapy Progress Note    Psychotherapy Provided: Individual Psychotherapy     1. Generalized anxiety disorder            Goals addressed in session: Goal 1     DATA: Client and therapist met in office for session. Client shared doing ok. Client shared that she learned from her mom that her grandfathers chemo treatments were not going to happen as his cancer was found spread out in his body. Client shared she was upset, shared she felt like she had had more time with him and was not sure now what to do. Client and therapist processed through, discussed that she most likely will start the greiving process now with this new information. Therapist actively listened and empathetically listened as client shared about being upset, wanting to talk with him, knowing he will be coming home for a few days to set his wife up with house stuff and then headed back to the hospital. Therapist and client worked on processing through this, discussed her visiting as much as she can as well as trying to talk with her grandmother. Client shared brother does not want to go to  and that she is mad at him for that. Therapist reminded client that people react differently to grief and that he may change his mind, however that she cannot force him.   During this session, this clinician used the following therapeutic modalities: Client-centered Therapy    Substance Abuse was not addressed during this session. If the client is diagnosed with a co-occurring substance use disorder, please indicate any changes in the frequency or amount of use:  Stage of change for addressing substance use diagnoses: No substance use/Not applicable    ASSESSMENT:  Nato Matta presents with a Euthymic/ normal mood.     her affect is Normal range and intensity, which is congruent, with her mood and the content of the session. The client has made progress on their goals.    Ntao Matta presents with a none risk of suicide, none risk of  "self-harm, and none risk of harm to others.    For any risk assessment that surpasses a \"low\" rating, a safety plan must be developed.    A safety plan was indicated: no  If yes, describe in detail on file    PLAN: Between sessions, Nato Matta will continue self- care. At the next session, the therapist will use Client-centered Therapy to address anxiety, pre-grief.    Behavioral Health Treatment Plan and Discharge Planning: Nato Matta is aware of and agrees to continue to work on their treatment plan. They have identified and are working toward their discharge goals. yes    Depression Follow-up Plan Completed: Not applicable    Visit start and stop times:    01/31/25  Start Time: 1000  Stop Time: 1054  Total Visit Time: 54 minutes  "

## 2025-02-04 ENCOUNTER — TELEPHONE (OUTPATIENT)
Dept: PSYCHIATRY | Facility: CLINIC | Age: 20
End: 2025-02-04

## 2025-02-04 DIAGNOSIS — F41.1 GENERALIZED ANXIETY DISORDER: ICD-10-CM

## 2025-02-04 DIAGNOSIS — F32.2 CURRENT SEVERE EPISODE OF MAJOR DEPRESSIVE DISORDER WITHOUT PSYCHOTIC FEATURES WITHOUT PRIOR EPISODE (HCC): ICD-10-CM

## 2025-02-04 RX ORDER — VENLAFAXINE HYDROCHLORIDE 75 MG/1
75 CAPSULE, EXTENDED RELEASE ORAL DAILY
Qty: 30 CAPSULE | Refills: 0 | Status: SHIPPED | OUTPATIENT
Start: 2025-02-04

## 2025-02-04 RX ORDER — QUETIAPINE FUMARATE 100 MG/1
TABLET, FILM COATED ORAL
Qty: 60 TABLET | Refills: 0 | Status: SHIPPED | OUTPATIENT
Start: 2025-02-04

## 2025-02-04 NOTE — TELEPHONE ENCOUNTER
Writer called and left voicemail for client to schedule a follow up with Marie Garcia as client had not been seen in December as planned.

## 2025-02-05 ENCOUNTER — TELEPHONE (OUTPATIENT)
Dept: PSYCHIATRY | Facility: CLINIC | Age: 20
End: 2025-02-05

## 2025-02-20 ENCOUNTER — SOCIAL WORK (OUTPATIENT)
Dept: BEHAVIORAL/MENTAL HEALTH CLINIC | Facility: CLINIC | Age: 20
End: 2025-02-20

## 2025-02-20 DIAGNOSIS — Z91.199 NO-SHOW FOR APPOINTMENT: Primary | ICD-10-CM

## 2025-02-20 PROCEDURE — NOSHOW

## 2025-02-21 NOTE — PSYCH
No Call. No Show. No Charge    Nato Alessiodenis no showed 02/20/2025 appointment , staff called and left message to reschedule appointment     Treatment Plan not due at this session.

## 2025-02-27 ENCOUNTER — SOCIAL WORK (OUTPATIENT)
Dept: BEHAVIORAL/MENTAL HEALTH CLINIC | Facility: CLINIC | Age: 20
End: 2025-02-27

## 2025-02-27 DIAGNOSIS — Z91.199 NO-SHOW FOR APPOINTMENT: Primary | ICD-10-CM

## 2025-02-27 PROCEDURE — NOSHOW

## 2025-02-28 NOTE — PSYCH
No Call. No Show. No Charge    Nato Alessiodenis no showed 02/27/2025 appointment , staff called and left message to reschedule appointment     Treatment Plan not due at this session.

## 2025-03-05 ENCOUNTER — TELEPHONE (OUTPATIENT)
Dept: PSYCHIATRY | Facility: CLINIC | Age: 20
End: 2025-03-05

## 2025-03-05 NOTE — TELEPHONE ENCOUNTER
Jefferym for pt to call the office   [phone]      to confirm the insurance for the upcoming appt with Karolina Jones on 3/6/25     Gloria ms

## 2025-03-06 ENCOUNTER — SOCIAL WORK (OUTPATIENT)
Dept: BEHAVIORAL/MENTAL HEALTH CLINIC | Facility: CLINIC | Age: 20
End: 2025-03-06

## 2025-03-06 DIAGNOSIS — Z91.199 NO-SHOW FOR APPOINTMENT: Primary | ICD-10-CM

## 2025-03-06 PROCEDURE — NOSHOW

## 2025-03-07 DIAGNOSIS — F32.2 CURRENT SEVERE EPISODE OF MAJOR DEPRESSIVE DISORDER WITHOUT PSYCHOTIC FEATURES WITHOUT PRIOR EPISODE (HCC): ICD-10-CM

## 2025-03-07 DIAGNOSIS — F41.1 GENERALIZED ANXIETY DISORDER: ICD-10-CM

## 2025-03-07 RX ORDER — VENLAFAXINE HYDROCHLORIDE 75 MG/1
75 CAPSULE, EXTENDED RELEASE ORAL DAILY
Qty: 90 CAPSULE | Refills: 1 | Status: SHIPPED | OUTPATIENT
Start: 2025-03-07

## 2025-03-07 NOTE — PSYCH
No Call. No Show. No Charge    Nato Paxton no showed 03/06/2025 appointment , staff called and left message to reschedule appointment     Treatment Plan not due at this session.

## 2025-03-07 NOTE — TELEPHONE ENCOUNTER
Patient called to request a refill for their (SEROquel) 100 mg  advised a refill was requested on 3/7/2025 and is pending approval. Patient verbalized understanding and is in agreement.     Does the patient have enough for 3 days?   [] Yes   [x] No - Send as HP to POD

## 2025-03-07 NOTE — TELEPHONE ENCOUNTER
Reason for call:   [x] Refill   [] Prior Auth  [] Other:     Office:   [] PCP/Provider -   [x] Specialty/Provider - : Bri Roth,  / sayra elizondo    Medication: venlafaxine (EFFEXOR-XR) 75 mg 24 hr capsule     Dose/Frequency: Take 1 capsule (75 mg total) by mouth daily,     Quantity: 90    [place] [facility] [address] [place] [address] Pharmacy   Does the patient have enough for 3 days?   [] Yes   [x] No - Send as HP to POD

## 2025-03-07 NOTE — TELEPHONE ENCOUNTER
Patient called refill line to report she is out of the seroquel and would like to  today.  Confirmed [facility]

## 2025-03-07 NOTE — TELEPHONE ENCOUNTER
Patient called to request a refill for their QUEtiapine (SEROquel) 100 mg tablet  advised a refill was requested on 3/7/2025 and is pending approval. Patient verbalized understanding and is in agreement.     Does the patient have enough for 3 days?   [] Yes   [x] No - Send as HP to POD     Patient would like to  medication today 3/7/2025

## 2025-03-10 RX ORDER — QUETIAPINE FUMARATE 100 MG/1
TABLET, FILM COATED ORAL
Qty: 60 TABLET | Refills: 0 | Status: SHIPPED | OUTPATIENT
Start: 2025-03-10

## 2025-03-10 RX ORDER — QUETIAPINE FUMARATE 100 MG/1
200 TABLET, FILM COATED ORAL
Qty: 60 TABLET | Refills: 0 | OUTPATIENT
Start: 2025-03-10

## 2025-03-10 NOTE — TELEPHONE ENCOUNTER
Patient called to ask why her refill for quetiapine was refused. She was informed that the request from the pharmacy was a duplicate and her MyChart request was approved (see other refill encounter 03/07/25). Patient was made aware medication was refilled on 03/10/25 for 60 tablets at [facility]. Patient instructed to contact the pharmacy to obtain refill of medication. Patient verbalized understanding.

## 2025-03-18 ENCOUNTER — TELEPHONE (OUTPATIENT)
Dept: PSYCHIATRY | Facility: CLINIC | Age: 20
End: 2025-03-18

## 2025-03-19 ENCOUNTER — TELEPHONE (OUTPATIENT)
Dept: PSYCHIATRY | Facility: CLINIC | Age: 20
End: 2025-03-19

## 2025-03-20 ENCOUNTER — DOCUMENTATION (OUTPATIENT)
Dept: BEHAVIORAL/MENTAL HEALTH CLINIC | Facility: CLINIC | Age: 20
End: 2025-03-20

## 2025-03-20 NOTE — PROGRESS NOTES
TRANSFER SUMMARY    [facility]    Patient Name Nato Matta     Date of Birth: 20 y.o. 2005      MRN: [id number]    Admission Date:  11/07/2024    Date of Transfer: March 20, 2025    Admission Diagnosis:     Generalized Anxiety Disorder    Current Diagnosis:     Generalized Anxiety Disorder    Reason for Admission: Nato presented for treatment due to anxiety, anxiety symptoms, and worsening anxiety. Primary complaints included ANXIETY SYMPTOMS: daily anxiety symptoms, feeling nervous, worrying, worrying daily. Nato currently struggles with anxiety due to family struggles. Stopped contact with father at younger age and working to strengthen relationship with paternal grandparents. Paternal grandfather recently diagnoses with cancer and suddenly passed away.     Progress in Treatment: Nato was seen for Individual Couseling. During the course of treatment she participated in talk-therapy working on re-framing negative thoughts into positive self-talk as well as working through familial situations.    Episodes of Higher Level of Care: No    Transfer request Initiated by: Psychiatrist: None Therapist:  Karolina Ng    Reason for Transfer Request:  Insurance    Does this individual need a clinician with specialized training/expertise?: No    Is this client working with any other [facility] Providers/Therapists? Psychiatrist: Marie BRASHER Therapist: None    Other pertinent issues: None    Are there any specific individuals who would be a “best fit” or who have already agreed to accept this transfer request?      Psychiatrist: None   Therapist: Dora William and Shannan Butterfield - Has worked with Joanrosa Hoang before.   Rationale: Patient prefers female clinician    Attempts to maintain the current therapeutic relationship: Yes, due to lack of insurance, unable to have sessions. Now currently has commercial insurance which current therapist can't accept.      Transfer request routed to Clinical Coordinator for input and/or approval.     Comments from other involved providers and/or clinical coordinator : None    Karolina Jones03/20/25

## 2025-03-21 ENCOUNTER — TELEPHONE (OUTPATIENT)
Dept: PSYCHIATRY | Facility: CLINIC | Age: 20
End: 2025-03-21

## 2025-04-02 ENCOUNTER — TELEPHONE (OUTPATIENT)
Dept: BEHAVIORAL/MENTAL HEALTH CLINIC | Facility: CLINIC | Age: 20
End: 2025-04-02

## 2025-04-02 NOTE — TELEPHONE ENCOUNTER
Called and left voicemail, letting client know that clinician would not be in office for scheduled visit (Thursday 4/3) at 9am. Encouraged client to return call and reschedule appointment.

## 2025-04-09 ENCOUNTER — OFFICE VISIT (OUTPATIENT)
Dept: BEHAVIORAL/MENTAL HEALTH CLINIC | Facility: CLINIC | Age: 20
End: 2025-04-09
Payer: COMMERCIAL

## 2025-04-09 DIAGNOSIS — F41.1 GENERALIZED ANXIETY DISORDER: Primary | ICD-10-CM

## 2025-04-09 DIAGNOSIS — F32.2 CURRENT SEVERE EPISODE OF MAJOR DEPRESSIVE DISORDER WITHOUT PSYCHOTIC FEATURES WITHOUT PRIOR EPISODE (HCC): ICD-10-CM

## 2025-04-09 DIAGNOSIS — F41.1 GENERALIZED ANXIETY DISORDER: ICD-10-CM

## 2025-04-09 PROCEDURE — 90837 PSYTX W PT 60 MINUTES: CPT | Performed by: SOCIAL WORKER

## 2025-04-09 RX ORDER — QUETIAPINE FUMARATE 100 MG/1
TABLET, FILM COATED ORAL
Qty: 60 TABLET | Refills: 0 | Status: SHIPPED | OUTPATIENT
Start: 2025-04-09

## 2025-04-09 NOTE — BH TREATMENT PLAN
Outpatient Behavioral Health Psychotherapy Treatment Plan    Nato Matta  2005     Date of Initial Psychotherapy Assessment: 11/7/2024  Date of Current Treatment Plan: 04/09/25  Treatment Plan Target Date: 10/9/25  Treatment Plan Expiration Date: 4/7/2025    Diagnosis:   1. Generalized anxiety disorder        2. Current severe episode of major depressive disorder without psychotic features without prior episode (HCC)              Area(s) of Need: Working through my anxiety, my anger    Long Term Goal 1 (in the client's own words): Talk about my feelings related to my dad.    Stage of Change: Preparation    Target Date for completion: 4/26     Anticipated therapeutic modalities: CBT, supportive counseling, mindfulness, client centered therapy, emotional regulation, grief counseling, Medication Management     People identified to complete this goal: Dora Dutton Jena      Objective 1: (identify the means of measuring success in meeting the objective): I will be able to discuss feelings of grief and anger during 80% of therapy sessions, as evidenced by open communication regarding my past experiences and frustrations related to my relationship with my father.       Objective 2: (identify the means of measuring success in meeting the objective): I will be able to demonstrate awareness of emotional regulation skills at least 3 times per week, as learned during therapy sessions, as self reported during treatment.       Long Term Goal 2 (in the client's own words): Improve frustration tolerance    Stage of Change: Preparation    Target Date for completion: 4/26     Anticipated therapeutic modalities: Anger management, CBT, Mindfulness, Emotional Regulation, Supportive Counseling, Client Centered Therapy, Medication Management     People identified to complete this goal: Dora Dutton Jena      Objective 1: (identify the means of measuring success in meeting the objective): I will be able to demonstrate an  understanding of at least 5 new coping skills and triggers each month during this review as self reported.      Objective 2: (identify the means of measuring success in meeting the objective): I will remain compliant with medication management regime as agreed upon during all psychiatric medication management session, 100% of the time.    I am currently under the care of a [facility] psychiatric provider: yes    My [facility] psychiatric provider is: Marie Garcia    I am currently taking psychiatric medications: Yes, as prescribed    I feel that I will be ready for discharge from mental health care when I reach the following (measurable goal/objective): when I feel like I can make it through the weeks using my coping skills and feeling less angry.     For children and adults who have a legal guardian:   Has there been any change to custody orders and/or guardianship status? NA. If yes, attach updated documentation.    I have reviewed my Crisis Plan and have been offered a copy of this plan    Behavioral Health Treatment Plan [facility]: Diagnosis and Treatment Plan explained to Nato Matta acknowledges an understanding of their diagnosis. Nato Matta agrees to this treatment plan.    I have been offered a copy of this Treatment Plan. yes

## 2025-04-09 NOTE — BH CRISIS PLAN
Client Name: Nato Matta       Client YOB: 2005    MichaelIshmael Safety Plan      Creation Date: 4/9/25 Update Date: 4/9/26   Created By: Dora William Last Updated By: Dora William      Step 1: Warning Signs:   Warning Signs   cry   [place]            Step 2: Internal Coping Strategies:   Internal Coping Strategies   being with Dog   color            Step 3: People and social settings that provide distraction:   Name Contact Information   Mom in cell phone   [place] [phone]            Step 4: People whom I can ask for help during a crisis:      Name Contact Information    Mom in phone    Girlfriend in phone    [place] [phone]      Step 5: Professionals or agencies I can contact during a crisis:      Clinican/Agency Name Phone Emergency Contact    [facility] [phone]      [facility] Emergency Department Emergency Department Phone Emergency Department Address    911          Crisis Phone Numbers:   Suicide Prevention Lifeline: Call or Text  478 Crisis Text Line: Text HOME to 950-012   Please note: Some [facility] do not have a separate number for Child/Adolescent specific crisis. If your county is not listed under Child/Adolescent, please call the adult number for your county      Adult Crisis Numbers: Child/Adolescent Crisis Numbers   [facility]: [phone] [facility]: [phone]   [facility]: [phone] [facility]: [phone]   [facility]: [phone] [place]: [phone]   [facility]: [phone] [place]: [phone]   [place]: [phone]   [facility]: [phone]   [facility]: [phone]   [facility]: [phone] (daytime) [phone] (after hours, weekends, holidays)      Step 6: Making the environment safer (plan for lethal means safety):   Patient did not identify any lethal methods: Yes     Optional: What is most important to me and worth living for?    Dog     Norah Safety Plan. Diane Rodriguez and Jakub Quiñones. Used with permission of the authors.

## 2025-04-09 NOTE — PSYCH
"Behavioral Health Psychotherapy Progress Note    Psychotherapy Provided: Individual Psychotherapy     1. Generalized anxiety disorder        2. Current severe episode of major depressive disorder without psychotic features without prior episode (HCC)            Goals addressed in session: Goal 1 and Goal 2     DATA: Therapist met with Nato for initial transfer session.  Therapist and client discussed her relationship with her father and difficulty with grieving this relationship.  Client noted boundaries that she had set and how he did not contact her following this boundary. Therapist and client discussed her feelings regarding his behavior and how this continues to impact her.  Therapist will continue to establish rapport through the next few weeks. Treatment plan and Crisis plan updated.   During this session, this clinician used the following therapeutic modalities: Client-centered Therapy, Cognitive Behavioral Therapy, Cognitive Processing Therapy, and Supportive Psychotherapy    Substance Abuse was addressed during this session. If the client is diagnosed with a co-occurring substance use disorder, please indicate any changes in the frequency or amount of use: na-Fathers addiction. Stage of change for addressing substance use diagnoses: No substance use/Not applicable    ASSESSMENT:  Nato Matta presents with a Euthymic/ normal mood.     her affect is Normal range and intensity and Flat, which is congruent, with her mood and the content of the session. The client has not made progress on their goals.     Nato Matta presents with a none risk of suicide, none risk of self-harm, and none risk of harm to others.    For any risk assessment that surpasses a \"low\" rating, a safety plan must be developed.    A safety plan was indicated: no  If yes, describe in detail na    PLAN: Between sessions, Nato Matta will establish rapport. At the next session, the therapist will use Supportive Psychotherapy to address " grief.    Behavioral Health Treatment Plan and Discharge Planning: Nato Matta is aware of and agrees to continue to work on their treatment plan. They have identified and are working toward their discharge goals. yes    Depression Follow-up Plan Completed: Yes Client will continue to attend all psychiatric treatment sessions and comply with all treatment recommendations as identified.    Visit start and stop times:    04/09/25  Start Time: 1401  Stop Time: 1500  Total Visit Time: 59 minutes

## 2025-05-07 DIAGNOSIS — F41.1 GENERALIZED ANXIETY DISORDER: ICD-10-CM

## 2025-05-07 DIAGNOSIS — F32.2 CURRENT SEVERE EPISODE OF MAJOR DEPRESSIVE DISORDER WITHOUT PSYCHOTIC FEATURES WITHOUT PRIOR EPISODE (HCC): ICD-10-CM

## 2025-05-08 ENCOUNTER — SOCIAL WORK (OUTPATIENT)
Dept: BEHAVIORAL/MENTAL HEALTH CLINIC | Facility: CLINIC | Age: 20
End: 2025-05-08

## 2025-05-08 DIAGNOSIS — F41.1 GENERALIZED ANXIETY DISORDER: Primary | ICD-10-CM

## 2025-05-08 DIAGNOSIS — F32.2 CURRENT SEVERE EPISODE OF MAJOR DEPRESSIVE DISORDER WITHOUT PSYCHOTIC FEATURES WITHOUT PRIOR EPISODE (HCC): ICD-10-CM

## 2025-05-08 DIAGNOSIS — F41.1 GENERALIZED ANXIETY DISORDER: ICD-10-CM

## 2025-05-08 PROCEDURE — NOSHOW: Performed by: SOCIAL WORKER

## 2025-05-08 RX ORDER — QUETIAPINE FUMARATE 100 MG/1
TABLET, FILM COATED ORAL
Qty: 60 TABLET | Refills: 0 | OUTPATIENT
Start: 2025-05-08

## 2025-05-08 RX ORDER — QUETIAPINE FUMARATE 100 MG/1
TABLET, FILM COATED ORAL
Qty: 60 TABLET | Refills: 0 | Status: SHIPPED | OUTPATIENT
Start: 2025-05-08

## 2025-05-08 NOTE — TELEPHONE ENCOUNTER
Writer left voicemail for client to schedule follow up appointment as we will not be able to continue providing refills for her medication since she was last seen in August 2024.

## 2025-05-08 NOTE — PSYCH
No Call. No Show. No Charge    Nato Matta no showed 05/08/25 appointment , staff called and left message to reschedule appointment     Treatment Plan not due at this session.

## 2025-05-08 NOTE — TELEPHONE ENCOUNTER
Bentley Duque- I have not seen Nato since August of 2024- please make follow up appointment. Thanks!

## 2025-05-08 NOTE — TELEPHONE ENCOUNTER
Medication Refill Request     Name of Medication Quetiapine  Dose/Frequency 100 mg/ Take 2 tablets at bedtime.  Quantity 60  Verified pharmacy   [x]  Verified ordering Provider   [x]  Does patient have enough for the next 3 days? Yes [] No [x]  Does patient have a follow-up appointment scheduled? Yes [x] No []   If so when is appointment: 5/27/2025 at 1:30 pm

## 2025-05-29 ENCOUNTER — TELEPHONE (OUTPATIENT)
Dept: PSYCHIATRY | Facility: CLINIC | Age: 20
End: 2025-05-29

## 2025-05-29 ENCOUNTER — DOCUMENTATION (OUTPATIENT)
Dept: BEHAVIORAL/MENTAL HEALTH CLINIC | Facility: CLINIC | Age: 20
End: 2025-05-29

## 2025-05-29 DIAGNOSIS — F41.1 GENERALIZED ANXIETY DISORDER: Primary | ICD-10-CM

## 2025-05-29 DIAGNOSIS — F32.2 CURRENT SEVERE EPISODE OF MAJOR DEPRESSIVE DISORDER WITHOUT PSYCHOTIC FEATURES WITHOUT PRIOR EPISODE (HCC): ICD-10-CM

## 2025-05-29 NOTE — PROGRESS NOTES
Psychotherapy Discharge Summary    Preferred Name: Nato Matta  YOB: 2005    Admission date to psychotherapy: 2/9/23    Referred by: self    Presenting Problem: emotional regulation    Course of treatment included : individual therapy     Progress/Outcome of Treatment Goals (brief summary of course of treatment) Client was able to continue to identify areas of difficulty and openly process them through treatment.    Treatment Complications (if any): engagement    Treatment Progress: fair    Current [facility] Provider: [facility]    Discharge Medications include: Seroquel, Effexor    Discharge Date: 5/29/25    Discharge Diagnosis:   1. Generalized anxiety disorder        2. Current severe episode of major depressive disorder without psychotic features without prior episode (HCC)            Criteria for Discharge: two or more unexcused absences for services    [facility]    Rationale: Should client identify treatment as necessary and is ready to commit to treatment, therapist will accept client.    Aftercare recommendations include (include specific referral names and phone numbers, if appropriate): Continue to comply with psychiatric treatment recommendations    Prognosis: fair

## 2025-05-29 NOTE — TELEPHONE ENCOUNTER
Will need an appointment for medication changes. She has not been seen since August of 2024. Thanks.

## 2025-05-29 NOTE — TELEPHONE ENCOUNTER
Called Nato [phone] and left  requesting a call back. Upon return call will advise that medication changes will need to be made at a follow up appointment.

## 2025-05-29 NOTE — TELEPHONE ENCOUNTER
Clinical will follow up as advised.    Left HIPAA compliant message for patient to return call to [phone].   Re: Follow up: COPD, Review POC

## 2025-05-29 NOTE — TELEPHONE ENCOUNTER
Hello I know it’s been a really long awhile since i’ve seen you. I did miss my appointment the other day, I forgot about it and I also had work then. But I’m going to reschedule for as soon as I can. I think I’m due for a change of medication or an increase. I’m just so reactive to EVERYTHING like even worse than before. It is absolutely unbearable sometimes not just for me but the people around me too. Anyways I’m gonna reschedule and sorry for missing my appointment.

## 2025-05-29 NOTE — TELEPHONE ENCOUNTER
Pt has been d/c from Talk Therapy services with [facility] at [facility] as of 5/29/25

## 2025-06-04 ENCOUNTER — TELEPHONE (OUTPATIENT)
Age: 20
End: 2025-06-04

## 2025-06-04 RX ORDER — FLUTICASONE PROPIONATE 50 MCG
1 SPRAY, SUSPENSION (ML) NASAL DAILY
COMMUNITY
Start: 2025-05-10

## 2025-06-04 RX ORDER — CIPROFLOXACIN AND DEXAMETHASONE 3; 1 MG/ML; MG/ML
SUSPENSION/ DROPS AURICULAR (OTIC)
COMMUNITY
Start: 2025-05-10

## 2025-06-04 NOTE — PSYCH
MEDICATION MANAGEMENT NOTE    Name: Nato Matta      : 2005      MRN: [id number]  Encounter Provider: ANSHU Renee  Encounter Date: 2025   Encounter department: [facility]    Insurance: Payor: T / Plan: [employer] / Product Type: [facility] /      Reason for Visit: Follow-up for medication management:  Assessment & Plan  Current severe episode of major depressive disorder without psychotic features without prior episode (HCC)   -Continue Seroquel 200 mg daily at bedtime for mood   -Start Pristiq 25 mg daily for anxiety and depression   -Discontinue Venlafaxine 75 mg daily for anxiety and depression-due to not taking consistently  Orders:    desvenlafaxine succinate 25 MG TB24; Take 1 tablet (25 mg total) by mouth daily    QUEtiapine (SEROquel) 100 mg tablet; Take 2 tablets at bedtime (for a total of 200 mg) daily    Generalized anxiety disorder   -Start Pristiq 25 mg daily for anxiety and depression   --Discontinue Venlafaxine 75 mg daily for anxiety and depression-due to not taking consistently  Orders:    desvenlafaxine succinate 25 MG TB24; Take 1 tablet (25 mg total) by mouth daily    QUEtiapine (SEROquel) 100 mg tablet; Take 2 tablets at bedtime (for a total of 200 mg) daily      Impression:  Generalized anxiety disorder   Current severe episode of major depressive disorder without psychotic features without prior episode   R/O Borderline Personality Disorder     Continue Seroquel 200 mg daily at bedtime for mood  Start Pristiq 25 mg daily for anxiety and depression  Discontinue Venlafaxine 75 mg daily for anxiety and depression  Recommend outpatient therapy-[facility]I in progress from [facility]  Medical follow up with PCP as needed  Follow up in 6 weeks      Treatment Plan:  Next treatment plan due 10/9/2025. Next crisis plan due 2026.     Treatment Recommendations:    Educated about diagnosis and treatment modalities.  "Verbalizes understanding and agreement with the treatment plan.  Discussed self monitoring of symptoms, and symptom monitoring tools.  Discussed medications and if treatment adjustment was needed or desired.  Aware of 24 hour and weekend coverage for urgent situations accessed by calling [facility] main practice number  I am scheduling this patient out for greater than 3 months: No    Medications Risks/Benefits:      Risks, Benefits And Possible Side Effects Of Medications:    Risks, benefits, and possible side effects of medications explained to Nato and she (or legal representative) verbalizes understanding and agreement for treatment.    Controlled Medication Discussion:     Not applicable      History of Present Illness     Nato is a 20 year old female being seen today for  follow-up appointment for medication management. Patient has psychiatric diagnoses including MDD and HASEEB. Patient is currently being observed on Effexor 37.5 mg and Seroquel 200 mg daily.  Nato is currently on the wait list for outpatient therapy at [facility]. No additional services in place at this time.      Nato reports medication compliance with the Seroquel and denies any side effects.  She states she has not been consistent with the Effexor 75 mg and feels it has not been working for her.  She states she takes the Effexor once every 4 days.  She got a letter in the mail for no showing her therapy appointments and has to be put back on the wait list.  She voiced her frustration with that.  Educated Nato on the [employer] policies.  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  She states her mood is \"not great\" and feels it is more than her current diagnosis.  She states she has a lot of borderline personality disorder symptoms such as going from 1-100 easily and in the moment has no control, fear of abandonment, and lakshmi relationships with people in her life.  She reports her sleep is adequate " "and she continues to eat 1 good meal per day.  Energy levels and motivation are low.  Nato denies SI, HI, AH, or VH.  She reports mood swings and irritability and states she feels \"guilty\" with how she reacts when she is angry.  Denies any periods of elevated mood or roselia.  She states she \"always\" has anxiety and depression.  We discussed options such as discontinuing the Effexor and trying Pristiq to which she is agreeable.  Encouraged Nato to be consistent with her medications as well as appointments.  Will discontinue Effexor XR 75 mg for inconsistency and start Pristiq 25 mg daily for anxiety and depression.    Review Of Systems: A review of systems is obtained and is negative except for the pertinent positives listed in HPI/Subjective above.      Current Rating Scores:     None completed today.    Areas of Improvement: reviewed in HPI/Subjective Section and reviewed in Assessment and Plan Section      Past Medical History[1]  Past Surgical History[2]  Allergies: Allergies[3]    Current Outpatient Medications   Medication Instructions    adapalene (DIFFERIN) 0.1 % gel Topical, Daily at bedtime    EPINEPHrine (EPIPEN) 0.3 mg, Intramuscular, Once    fluconazole (DIFLUCAN) 150 mg tablet     magnesium citrate (CITROMA) 1.745 g/30 mL oral solution 296 mL, Oral, Once    polyethylene glycol (GLYCOLAX) 17 g, Oral, 2 times daily    QUEtiapine (SEROquel) 100 mg tablet Take 2 tablets at bedtime (for a total of 200 mg) daily    venlafaxine (EFFEXOR-XR) 75 mg, Oral, Daily        Substance Abuse History:    Tobacco, Alcohol and Drug Use History     Tobacco Use    Smoking status: Never    Smokeless tobacco: Never    Tobacco comments:     vaping   Vaping Use    Vaping status: Every Day    Substances: Nicotine   Substance Use Topics    Alcohol use: Never    Drug use: Never     Alcohol Use: Not At Risk (1/22/2021)    AUDIT-C     Frequency of Alcohol Consumption: Never       Social History:    Social History     Socioeconomic " History    Marital status: Single     Spouse name: Not on file    Number of children: Not on file    Years of education: Not on file    Highest education level: Not on file   Occupational History    Not on file   Other Topics Concern    Not on file   Social History Narrative    Not on file        Family Psychiatric History:     Family History[4]    Medical History Reviewed by provider this encounter:          Objective   There were no vitals taken for this visit.     Mental Status Evaluation:    Appearance age appropriate, casually dressed   Behavior cooperative, calm   Speech normal rate, normal volume, normal pitch, spontaneous   Mood depressed, anxious   Affect normal range and intensity, appropriate   Thought Processes organized, goal directed   Thought Content no overt delusions   Perceptual Disturbances: no auditory hallucinations, no visual hallucinations   Abnormal Thoughts  Risk Potential Suicidal ideation - None  Homicidal ideation - None  Potential for aggression - No   Orientation oriented to person, place, time/date, and situation   Memory recent and remote memory grossly intact   Consciousness alert and awake   Attention Span Concentration Span attention span and concentration are age appropriate   Intellect appears to be of average intelligence   Insight intact   Judgement intact   Muscle Strength and  Gait normal muscle strength and normal muscle tone, normal gait and normal balance   Motor activity no abnormal movements   Language no difficulty naming common objects, no difficulty repeating a phrase, no difficulty writing a sentence   Fund of Knowledge adequate knowledge of current events  adequate fund of knowledge regarding past history  adequate fund of knowledge regarding vocabulary        Laboratory Results: I have personally reviewed all pertinent laboratory/tests results    Most Recent Labs:   Lab Results   Component Value Date    WBC 7.74 12/06/2024    RBC 4.52 12/06/2024    HGB 13.3  12/06/2024    HCT 39.8 12/06/2024     12/06/2024    RDW 12.9 12/06/2024    NEUTROABS 6.53 12/06/2024    SODIUM 136 12/06/2024    K 4.0 12/06/2024     12/06/2024    CO2 28 12/06/2024    BUN 13 12/06/2024    CREATININE 0.60 12/06/2024    CALCIUM 8.8 12/06/2024    AST 15 12/06/2024    ALT 10 12/06/2024    ALKPHOS 57 12/06/2024    TP 6.9 12/06/2024    TBILI 0.54 12/06/2024    CHOLESTEROL 155 05/26/2023    TRIG 62 05/26/2023    HDL 60 05/26/2023    LDLCALC 81 05/26/2023    VHP6GTVAKOHX 0.503 12/06/2024    FREET4 1.0 01/30/2024    RPR NON-REACTIVE 05/26/2023       Suicide/Homicide Risk Assessment:    Risk of Harm to Self:  The following ratings are based on assessment at the time of the interview  Historical Risk Factors include: chronic psychiatric problems  Protective Factors: no current suicidal ideation, ability to adapt to change, able to make plans for the future, access to mental health treatment, compliant with medications, compliant with mental health treatment    Risk of Harm to Others:  The following ratings are based on assessment at the time of the interview  Historical Risk Factors include: none  Protective Factors: no current homicidal ideation, ability to adapt to change, able to manage anger well, access to mental health treatment, compliant with medications, compliant with mental health treatment    The following interventions are recommended: Continue medication management. No other intervention changes indicated at this time.    Psychotherapy Provided:     Individual psychotherapy provided: No    Treatment Plan:    Completed and signed during the session: Not applicable - Treatment Plan to be completed by [facility] therapist.    Goals: Progress towards Treatment Plan goals - Yes, progressing, as evidenced by subjective findings in HPI/Subjective Section and in Assessment and Plan Section    Depression Follow-up Plan Completed: Yes      Visit Time  Visit Start Time:  "11:27 AM  Visit Stop Time: 11:50 AM  Total Visit Duration: 23 minutes    Portions of the record may have been created with voice recognition software. Occasional wrong word or \"sound a like\" substitutions may have occurred due to the inherent limitations of voice recognition software. Read the chart carefully and recognize, using context, where substitutions have occurred.    ANSHU Renee 06/04/25         [1]   Past Medical History:  Diagnosis Date    Allergic     Asthma     Depression    [2] No past surgical history on file.  [3]   Allergies  Allergen Reactions    Measles And Rubella Virus Vaccine Anaphylaxis, Hives and Shortness Of Breath    Cherry - Food Allergy     Peach [Prunus Persica]     Strawberry C [Ascorbate - Food Allergy] Itching     throat    Tomato - Food Allergy     Tree Extract Other (See Comments)     Done by blood test      Cucumber Extract - Food Allergy      Throat itchy      Nuts - Food Allergy Itching   [4]   Family History  Problem Relation Name Age of Onset    No Known Problems Mother CHYNA     Addiction problem Father Ant Mccallumdenis Roberts         Alcoholic    Asthma Sister Tayla      Asthma Maternal Grandmother Martha Willson     Diabetes Maternal Grandmother Martha Willson     Heart disease Maternal Grandmother Martha Willson     Colon cancer Neg Hx      Colon polyps Neg Hx       "

## 2025-06-04 NOTE — TELEPHONE ENCOUNTER
Caller: patient    Reason for call: Transfer of care    Provider or Therapist name: Dora [facility]     Current office/Department and Service: [facility] - Therapy    Last seen: 4/9/2025    Confirmed information:   Phone Number [x]   Address [x]  Insurance [x]    Best contact/Number: [phone]    Reason for request: did not have a good match with provider.     Writer informed patient that she would need to go on wait list since she was d/c from services.     Patient insisted on communicating with ss.     Please assist.

## 2025-06-05 ENCOUNTER — OFFICE VISIT (OUTPATIENT)
Dept: PSYCHIATRY | Facility: CLINIC | Age: 20
End: 2025-06-05
Payer: COMMERCIAL

## 2025-06-05 DIAGNOSIS — F41.1 GENERALIZED ANXIETY DISORDER: ICD-10-CM

## 2025-06-05 DIAGNOSIS — F32.2 CURRENT SEVERE EPISODE OF MAJOR DEPRESSIVE DISORDER WITHOUT PSYCHOTIC FEATURES WITHOUT PRIOR EPISODE (HCC): Primary | ICD-10-CM

## 2025-06-05 PROCEDURE — 99214 OFFICE O/P EST MOD 30 MIN: CPT

## 2025-06-05 RX ORDER — DESVENLAFAXINE 25 MG/1
25 TABLET, EXTENDED RELEASE ORAL DAILY
Qty: 30 TABLET | Refills: 1 | Status: SHIPPED | OUTPATIENT
Start: 2025-06-05

## 2025-06-05 RX ORDER — QUETIAPINE FUMARATE 100 MG/1
TABLET, FILM COATED ORAL
Qty: 60 TABLET | Refills: 2 | Status: SHIPPED | OUTPATIENT
Start: 2025-06-05

## 2025-06-05 NOTE — ASSESSMENT & PLAN NOTE
-Start Pristiq 25 mg daily for anxiety and depression   --Discontinue Venlafaxine 75 mg daily for anxiety and depression-due to not taking consistently  Orders:    desvenlafaxine succinate 25 MG TB24; Take 1 tablet (25 mg total) by mouth daily    QUEtiapine (SEROquel) 100 mg tablet; Take 2 tablets at bedtime (for a total of 200 mg) daily

## 2025-06-05 NOTE — ASSESSMENT & PLAN NOTE
-Continue Seroquel 200 mg daily at bedtime for mood   -Start Pristiq 25 mg daily for anxiety and depression   -Discontinue Venlafaxine 75 mg daily for anxiety and depression-due to not taking consistently  Orders:    desvenlafaxine succinate 25 MG TB24; Take 1 tablet (25 mg total) by mouth daily    QUEtiapine (SEROquel) 100 mg tablet; Take 2 tablets at bedtime (for a total of 200 mg) daily

## 2025-07-08 DIAGNOSIS — F32.2 CURRENT SEVERE EPISODE OF MAJOR DEPRESSIVE DISORDER WITHOUT PSYCHOTIC FEATURES WITHOUT PRIOR EPISODE (HCC): ICD-10-CM

## 2025-07-08 DIAGNOSIS — F41.1 GENERALIZED ANXIETY DISORDER: ICD-10-CM

## 2025-07-09 RX ORDER — DESVENLAFAXINE 25 MG/1
25 TABLET, EXTENDED RELEASE ORAL DAILY
Qty: 30 TABLET | Refills: 1 | OUTPATIENT
Start: 2025-07-09

## 2025-07-15 NOTE — PSYCH
No Call. No Show. No Charge    Nato Paxton no showed 07/18/25 appointment , staff called and left message to reschedule appointment     Treatment Plan not due at this session.

## 2025-07-18 ENCOUNTER — TELEMEDICINE (OUTPATIENT)
Dept: PSYCHIATRY | Facility: CLINIC | Age: 20
End: 2025-07-18

## 2025-07-18 DIAGNOSIS — F41.1 GENERALIZED ANXIETY DISORDER: Primary | ICD-10-CM

## 2025-07-18 DIAGNOSIS — F32.2 CURRENT SEVERE EPISODE OF MAJOR DEPRESSIVE DISORDER WITHOUT PSYCHOTIC FEATURES WITHOUT PRIOR EPISODE (HCC): ICD-10-CM

## 2025-07-18 PROCEDURE — NOSHOW

## 2025-08-21 ENCOUNTER — TELEPHONE (OUTPATIENT)
Age: 20
End: 2025-08-21

## 2025-08-22 DIAGNOSIS — F32.2 CURRENT SEVERE EPISODE OF MAJOR DEPRESSIVE DISORDER WITHOUT PSYCHOTIC FEATURES WITHOUT PRIOR EPISODE (HCC): ICD-10-CM

## 2025-08-22 DIAGNOSIS — F41.1 GENERALIZED ANXIETY DISORDER: ICD-10-CM

## 2025-08-22 RX ORDER — DESVENLAFAXINE 50 MG/1
50 TABLET, FILM COATED, EXTENDED RELEASE ORAL DAILY
Qty: 30 TABLET | Refills: 0 | Status: SHIPPED | OUTPATIENT
Start: 2025-08-22